# Patient Record
Sex: MALE | Race: OTHER | HISPANIC OR LATINO | URBAN - METROPOLITAN AREA
[De-identification: names, ages, dates, MRNs, and addresses within clinical notes are randomized per-mention and may not be internally consistent; named-entity substitution may affect disease eponyms.]

---

## 2019-01-28 ENCOUNTER — OUTPATIENT (OUTPATIENT)
Dept: OUTPATIENT SERVICES | Facility: HOSPITAL | Age: 54
LOS: 1 days | End: 2019-01-28
Payer: MEDICAID

## 2019-01-28 ENCOUNTER — APPOINTMENT (OUTPATIENT)
Dept: NEUROSURGERY | Facility: CLINIC | Age: 54
End: 2019-01-28
Payer: MEDICAID

## 2019-01-28 VITALS
BODY MASS INDEX: 27.99 KG/M2 | SYSTOLIC BLOOD PRESSURE: 131 MMHG | HEIGHT: 69 IN | WEIGHT: 189 LBS | TEMPERATURE: 98.4 F | DIASTOLIC BLOOD PRESSURE: 91 MMHG | OXYGEN SATURATION: 95 % | HEART RATE: 86 BPM | RESPIRATION RATE: 16 BRPM

## 2019-01-28 DIAGNOSIS — R29.898 OTHER SYMPTOMS AND SIGNS INVOLVING THE MUSCULOSKELETAL SYSTEM: ICD-10-CM

## 2019-01-28 PROBLEM — Z00.00 ENCOUNTER FOR PREVENTIVE HEALTH EXAMINATION: Status: ACTIVE | Noted: 2019-01-28

## 2019-01-28 PROCEDURE — 72050 X-RAY EXAM NECK SPINE 4/5VWS: CPT

## 2019-01-28 PROCEDURE — 99215 OFFICE O/P EST HI 40 MIN: CPT

## 2019-01-28 PROCEDURE — 72050 X-RAY EXAM NECK SPINE 4/5VWS: CPT | Mod: 26

## 2019-01-29 PROBLEM — R29.898 WEAKNESS OF BOTH LOWER EXTREMITIES: Status: ACTIVE | Noted: 2019-01-29

## 2019-01-29 NOTE — REASON FOR VISIT
[New Patient Visit] : a new patient visit [[employer] ] : provided by [employer]   [FreeTextEntry1] : [id number] [FreeTextEntry2] : Shannan

## 2019-01-29 NOTE — PHYSICAL EXAM
[General Appearance - Alert] : alert [General Appearance - In No Acute Distress] : in no acute distress [Oriented To Time, Place, And Person] : oriented to person, place, and time [Sclera] : the sclera and conjunctiva were normal [PERRL With Normal Accommodation] : pupils were equal in size, round, reactive to light, with normal accommodation [Extraocular Movements] : extraocular movements were intact [Outer Ear] : the ears and nose were normal in appearance [Neck Appearance] : the appearance of the neck was normal [] : no respiratory distress [Respiration, Rhythm And Depth] : normal respiratory rhythm and effort [Heart Rate And Rhythm] : heart rate was normal and rhythm regular [Skin Color & Pigmentation] : normal skin color and pigmentation [FreeTextEntry6] : bilateral lower extremity strength 4/5 [FreeTextEntry9] : + Perry's sign

## 2019-01-29 NOTE — REVIEW OF SYSTEMS
[Negative] : Heme/Lymph [As Noted in HPI] : as noted in HPI [Arm Weakness] : arm weakness [Hand Weakness] :  hand weakness [Leg Weakness] : leg weakness [Numbness] : numbness [Tingling] : tingling [Difficulty Walking] : difficulty walking

## 2019-01-29 NOTE — HISTORY OF PRESENT ILLNESS
[de-identified] : 53 year old, right handed, man with no pertinent past medical history who presents to [facility] today for neurosurgical evaluation of cervical spine. He reported to [place] ED with reports of progressive worsening neck and back pain radiating to bilateral lower extremities with associated numbness/tingling.  He reports bilateral leg weakness and significant gait disturbance. He uses a cane to ambulate. Denies bowel/bladder incontinence. MRI cervical spine indicates severe cervical stenosis at C6-C7 with cervical cord compression.\par \par His symptoms are greatly affecting his quality of life. He can no longer work or perform daily routine activities due to severity of symptoms.

## 2019-01-29 NOTE — DATA REVIEWED
[de-identified] : Accession No. : [id number]\par Patient Name / ID : OSEI JAY  / [id number]\par Exam Date : 2019 18:27:35 ( Approved )\par Study Comment : \par Sex / Age : M  / 53Y\par \par Creator : \par Dictator : \par  : \par Approver : \par Approver2 : \par \par Report Date : \par My Comment : \par ***********************************************************************************\par \par [facility]\par [address] [place] [address]\par [phone]\[facility] Department of Medical Imaging\par -------------------------------------------------------------------------------\par ------------------------------------------------------------------------------\par Patient: MISTY FRANZ        Unit #: [id number]       Acct#: [id number]\par : 63   Age: 53        Sex: M\par Report #: [id number]        Service Date: 19       Service Time: 1613\par Order #: [id number]       Location: [facility]       Admit Source: SED\par Attending MD: ENRIQUETA CHAVIS MD\par Ordering MD: ENRIQUETA CHAVIS MD\par Room/Bed: 1-A\par -------------------------------------------------------------------------------\par ------------------------------------------------------------------------------\par MAGNETIC RESONANCE IMAGING  REPORT\par Procedure: MRI CERVICAL SPINE W/O CONT\par -------------------------------------------------------------------------------\par -------------------------------------------------------------------------------\par Signed\par CSPINE_MR\par EXAM:\par MR Cervical Spine Without Contrast\par EXAM DATE/TIME:\par 2019 4:13 PM\par CLINICAL HISTORY:\par 53 years old, male; 723.0 cervical spinal stenosis . Inpatient.\par TECHNIQUE:\par Multiplanar magnetic resonance images of the cervical spine\par without contrast.\par COMPARISON:\par CT-CERVICAL SPINE W/O CONTRAST 2019 10:31 PM. Cervical\par spine x-ray\par \par FINDINGS:\par Vertebrae: No compression deformity. C6-7 1 millimeter\par anterolisthesis\par similar to prior CT scan yesterday. No focal destructive marrow\par lesion.\par DISCS/SPINAL CANAL/NEURAL FORAMINA:\par C2-C3: No stenosis.\par C3-C4: Disc bulge and indenting and severely effacing the\par ventral\par subarachnoid space. AP diameter central osseous canal and the\par midline\par approximately 7 mm.\par C4-C5: Moderate right and severe degenerative right foraminal\par stenosis.\par C5-C6: Disc bulging indenting but not effacing the ventral\par subarachnoid\par space. Degenerative moderate left foraminal stenosis.\par C6-C7: Aforementioned listhesis with a left paracentral disc\par extrusion\par indenting the cervical spinal cord. Hypertrophy of the\par ligamentum flavum and\par the disc pathology healed severe central stenosis with cord\par compression and\par myelopathy.\par C7-T1: No stenosis.\par Spinal cord: Compressed at C5-6 with myelopathic signal at this\par level. Cord\par normal size.\par Vasculature: not formally evaluated\par Soft tissues: not formally evaluated\par IMPRESSION:\par 1. C6-7 degenerative anterolisthesis as above producing severe\par central\par stenosis with cervical spinal cord compression and myelopathy.\par Listhesis is\par similar compared to the prior CT and prior x-rays.\par 2. Additional degenerative changes cervical spine with\par peripheral stenosis as\par described in further detail above.\par This report was electronically signed by:\par Fritz Francis MD\par <<Signature on File>>\par Dictating MD:   FRITZ FRANCIS MD\par Signing MD:  FRITZ FRANCIS MD\par Dictation D/T: 19\par Transcription D/T: 19\par Transcribed By:\par Report #: [id number]\par

## 2019-01-29 NOTE — PLAN
[FreeTextEntry1] : Imaging reviewed in detail with patient and patient’s family \par Surgery recommended, C6-C7 laminectomy, C5-T1 Fusion\par Discussed goal of surgery is to HALT progression of symptoms.\par Risks, benefits and alternatives to surgery discussed with patient. \par Multiple questions answered to patient’s satisfaction. \par Risks include but is not limited to infection, no resolution of symptoms/device failure\par Education provided regarding pre-operative clearance requirements\par PST packet reviewed with and given to patient\par Education provided regarding use of chlorhexidine wipes pre-op\par Nasal Swab completed for MRSA\par \par Surgery scheduled for 2/12/19.\par \par Patient and patient’s family understand and wish to proceed with planned surgery. \par \par Plan:\par 1. Surgery recommended - scheduled for 2/12/19\par \par \par \par

## 2019-01-30 ENCOUNTER — OUTPATIENT (OUTPATIENT)
Dept: OUTPATIENT SERVICES | Facility: HOSPITAL | Age: 54
LOS: 1 days | End: 2019-01-30
Payer: COMMERCIAL

## 2019-01-30 DIAGNOSIS — G95.20 UNSPECIFIED CORD COMPRESSION: ICD-10-CM

## 2019-01-30 DIAGNOSIS — M48.02 SPINAL STENOSIS, CERVICAL REGION: ICD-10-CM

## 2019-01-30 DIAGNOSIS — Z22.321 CARRIER OR SUSPECTED CARRIER OF METHICILLIN SUSCEPTIBLE STAPHYLOCOCCUS AUREUS: ICD-10-CM

## 2019-01-30 PROCEDURE — 87641 MR-STAPH DNA AMP PROBE: CPT

## 2019-02-11 VITALS
RESPIRATION RATE: 16 BRPM | HEART RATE: 84 BPM | HEIGHT: 69 IN | DIASTOLIC BLOOD PRESSURE: 83 MMHG | OXYGEN SATURATION: 98 % | TEMPERATURE: 97 F | SYSTOLIC BLOOD PRESSURE: 122 MMHG | WEIGHT: 188.94 LBS

## 2019-02-11 NOTE — PATIENT PROFILE ADULT - NSPROEDALEARNPREF_GEN_A_NUR
computer/internet/skill demonstration/audio/verbal instruction/written material/group instruction/individual instruction

## 2019-02-12 ENCOUNTER — INPATIENT (INPATIENT)
Facility: HOSPITAL | Age: 54
LOS: 5 days | Discharge: NURSING, REHAB OR LONG-TERM CARE | DRG: 472 | End: 2019-02-18
Attending: NEUROLOGICAL SURGERY | Admitting: NEUROLOGICAL SURGERY
Payer: COMMERCIAL

## 2019-02-12 DIAGNOSIS — M48.02 SPINAL STENOSIS, CERVICAL REGION: ICD-10-CM

## 2019-02-12 PROCEDURE — 22600 ARTHRD PST TQ 1NTRSPC CRV: CPT

## 2019-02-12 PROCEDURE — 63045 LAM FACETEC & FORAMOT CRV: CPT

## 2019-02-12 PROCEDURE — 63048 LAM FACETEC &FORAMOT EA ADDL: CPT

## 2019-02-12 PROCEDURE — 61783 SCAN PROC SPINAL: CPT | Mod: 59

## 2019-02-12 PROCEDURE — 22842 INSERT SPINE FIXATION DEVICE: CPT

## 2019-02-12 PROCEDURE — 22614 ARTHRD PST TQ 1NTRSPC EA ADD: CPT

## 2019-02-12 RX ORDER — ONDANSETRON 8 MG/1
4 TABLET, FILM COATED ORAL EVERY 6 HOURS
Qty: 0 | Refills: 0 | Status: DISCONTINUED | OUTPATIENT
Start: 2019-02-12 | End: 2019-02-18

## 2019-02-12 RX ORDER — ACETAMINOPHEN 500 MG
1000 TABLET ORAL ONCE
Qty: 0 | Refills: 0 | Status: COMPLETED | OUTPATIENT
Start: 2019-02-12 | End: 2019-02-13

## 2019-02-12 RX ORDER — HYDROMORPHONE HYDROCHLORIDE 2 MG/ML
0.5 INJECTION INTRAMUSCULAR; INTRAVENOUS; SUBCUTANEOUS
Qty: 0 | Refills: 0 | Status: DISCONTINUED | OUTPATIENT
Start: 2019-02-12 | End: 2019-02-14

## 2019-02-12 RX ORDER — PANTOPRAZOLE SODIUM 20 MG/1
40 TABLET, DELAYED RELEASE ORAL DAILY
Qty: 0 | Refills: 0 | Status: DISCONTINUED | OUTPATIENT
Start: 2019-02-12 | End: 2019-02-13

## 2019-02-12 RX ORDER — CEFAZOLIN SODIUM 1 G
2000 VIAL (EA) INJECTION EVERY 8 HOURS
Qty: 0 | Refills: 0 | Status: DISCONTINUED | OUTPATIENT
Start: 2019-02-12 | End: 2019-02-12

## 2019-02-12 RX ORDER — ACETAMINOPHEN 500 MG
650 TABLET ORAL EVERY 6 HOURS
Qty: 0 | Refills: 0 | Status: DISCONTINUED | OUTPATIENT
Start: 2019-02-12 | End: 2019-02-12

## 2019-02-12 RX ORDER — SENNA PLUS 8.6 MG/1
2 TABLET ORAL AT BEDTIME
Qty: 0 | Refills: 0 | Status: DISCONTINUED | OUTPATIENT
Start: 2019-02-12 | End: 2019-02-18

## 2019-02-12 RX ORDER — OMEPRAZOLE 10 MG/1
1 CAPSULE, DELAYED RELEASE ORAL
Qty: 0 | Refills: 0 | COMMUNITY

## 2019-02-12 RX ORDER — CEFAZOLIN SODIUM 1 G
2000 VIAL (EA) INJECTION EVERY 8 HOURS
Qty: 0 | Refills: 0 | Status: COMPLETED | OUTPATIENT
Start: 2019-02-12 | End: 2019-02-13

## 2019-02-12 RX ORDER — HYDROMORPHONE HYDROCHLORIDE 2 MG/ML
0.5 INJECTION INTRAMUSCULAR; INTRAVENOUS; SUBCUTANEOUS
Qty: 0 | Refills: 0 | Status: DISCONTINUED | OUTPATIENT
Start: 2019-02-12 | End: 2019-02-12

## 2019-02-12 RX ORDER — SODIUM CHLORIDE 9 MG/ML
1000 INJECTION INTRAMUSCULAR; INTRAVENOUS; SUBCUTANEOUS
Qty: 0 | Refills: 0 | Status: DISCONTINUED | OUTPATIENT
Start: 2019-02-12 | End: 2019-02-14

## 2019-02-12 RX ORDER — ACETAMINOPHEN 500 MG
650 TABLET ORAL EVERY 6 HOURS
Qty: 0 | Refills: 0 | Status: DISCONTINUED | OUTPATIENT
Start: 2019-02-12 | End: 2019-02-18

## 2019-02-12 RX ORDER — DIAZEPAM 5 MG
5 TABLET ORAL EVERY 8 HOURS
Qty: 0 | Refills: 0 | Status: DISCONTINUED | OUTPATIENT
Start: 2019-02-12 | End: 2019-02-13

## 2019-02-12 RX ORDER — DOCUSATE SODIUM 100 MG
100 CAPSULE ORAL THREE TIMES A DAY
Qty: 0 | Refills: 0 | Status: DISCONTINUED | OUTPATIENT
Start: 2019-02-12 | End: 2019-02-18

## 2019-02-12 RX ORDER — HYDROMORPHONE HYDROCHLORIDE 2 MG/ML
30 INJECTION INTRAMUSCULAR; INTRAVENOUS; SUBCUTANEOUS
Qty: 0 | Refills: 0 | Status: DISCONTINUED | OUTPATIENT
Start: 2019-02-12 | End: 2019-02-14

## 2019-02-12 RX ADMIN — HYDROMORPHONE HYDROCHLORIDE 30 MILLILITER(S): 2 INJECTION INTRAMUSCULAR; INTRAVENOUS; SUBCUTANEOUS at 22:01

## 2019-02-12 RX ADMIN — HYDROMORPHONE HYDROCHLORIDE 0.5 MILLIGRAM(S): 2 INJECTION INTRAMUSCULAR; INTRAVENOUS; SUBCUTANEOUS at 20:36

## 2019-02-12 RX ADMIN — HYDROMORPHONE HYDROCHLORIDE 0.5 MILLIGRAM(S): 2 INJECTION INTRAMUSCULAR; INTRAVENOUS; SUBCUTANEOUS at 21:10

## 2019-02-12 NOTE — H&P ADULT - NSHPPHYSICALEXAM_GEN_ALL_CORE
A/Ox3, follows commands, speech clear  B/L LE 4/5, B/L UE 5/5 strength  Sensation intact light touch all dermatomes  +Prery's sign

## 2019-02-12 NOTE — H&P ADULT - HISTORY OF PRESENT ILLNESS
53 year old, right handed, man with no pertinent past medical history who presents to [facility] today for neurosurgical procedure of cervical spine. He reported to [place] ED with reports of progressive worsening neck and back pain radiating to bilateral lower extremities with associated numbness/tingling. He reports bilateral leg weakness and significant gait disturbance. He uses a cane to ambulate. Denies bowel/bladder incontinence. MRI cervical spine indicates severe cervical stenosis at C6-C7 with cervical cord compression.  His symptoms are greatly affecting his quality of life. He can no longer work or perform daily routine activities due to severity of symptoms

## 2019-02-12 NOTE — BRIEF OPERATIVE NOTE - PROCEDURE
<<-----Click on this checkbox to enter Procedure Laminectomy  02/12/2019  C6-7, C5-T1 fusion  Active  NSTASI

## 2019-02-12 NOTE — H&P ADULT - PROBLEM SELECTOR PLAN 1
-admit to neurosurgery, [facility]  -C6-7 lami, C5-T1 fusion  -PT/OOB  -Pain control  -DVT/GI ppx  -Dispo planning

## 2019-02-13 LAB
ANION GAP SERPL CALC-SCNC: 11 MMOL/L — SIGNIFICANT CHANGE UP (ref 5–17)
BUN SERPL-MCNC: 22 MG/DL — SIGNIFICANT CHANGE UP (ref 7–23)
CALCIUM SERPL-MCNC: 9.2 MG/DL — SIGNIFICANT CHANGE UP (ref 8.4–10.5)
CHLORIDE SERPL-SCNC: 100 MMOL/L — SIGNIFICANT CHANGE UP (ref 96–108)
CO2 SERPL-SCNC: 25 MMOL/L — SIGNIFICANT CHANGE UP (ref 22–31)
CREAT SERPL-MCNC: 1.52 MG/DL — HIGH (ref 0.5–1.3)
GLUCOSE SERPL-MCNC: 141 MG/DL — HIGH (ref 70–99)
HCT VFR BLD CALC: 36.4 % — LOW (ref 39–50)
HGB BLD-MCNC: 12 G/DL — LOW (ref 13–17)
MAGNESIUM SERPL-MCNC: 1.8 MG/DL — SIGNIFICANT CHANGE UP (ref 1.6–2.6)
MCHC RBC-ENTMCNC: 32.6 PG — SIGNIFICANT CHANGE UP (ref 27–34)
MCHC RBC-ENTMCNC: 33 GM/DL — SIGNIFICANT CHANGE UP (ref 32–36)
MCV RBC AUTO: 98.9 FL — SIGNIFICANT CHANGE UP (ref 80–100)
NRBC # BLD: 0 /100 WBCS — SIGNIFICANT CHANGE UP (ref 0–0)
PHOSPHATE SERPL-MCNC: 4.4 MG/DL — SIGNIFICANT CHANGE UP (ref 2.5–4.5)
PLATELET # BLD AUTO: 203 K/UL — SIGNIFICANT CHANGE UP (ref 150–400)
POTASSIUM SERPL-MCNC: 4.6 MMOL/L — SIGNIFICANT CHANGE UP (ref 3.5–5.3)
POTASSIUM SERPL-SCNC: 4.6 MMOL/L — SIGNIFICANT CHANGE UP (ref 3.5–5.3)
RBC # BLD: 3.68 M/UL — LOW (ref 4.2–5.8)
RBC # FLD: 13.4 % — SIGNIFICANT CHANGE UP (ref 10.3–14.5)
SODIUM SERPL-SCNC: 136 MMOL/L — SIGNIFICANT CHANGE UP (ref 135–145)
WBC # BLD: 10.17 K/UL — SIGNIFICANT CHANGE UP (ref 3.8–10.5)
WBC # FLD AUTO: 10.17 K/UL — SIGNIFICANT CHANGE UP (ref 3.8–10.5)

## 2019-02-13 RX ORDER — DIAZEPAM 5 MG
5 TABLET ORAL EVERY 8 HOURS
Qty: 0 | Refills: 0 | Status: DISCONTINUED | OUTPATIENT
Start: 2019-02-13 | End: 2019-02-14

## 2019-02-13 RX ORDER — ACETAMINOPHEN 500 MG
TABLET ORAL
Qty: 0 | Refills: 0 | Status: DISCONTINUED | OUTPATIENT
Start: 2019-02-13 | End: 2019-02-13

## 2019-02-13 RX ORDER — PANTOPRAZOLE SODIUM 20 MG/1
40 TABLET, DELAYED RELEASE ORAL DAILY
Qty: 0 | Refills: 0 | Status: DISCONTINUED | OUTPATIENT
Start: 2019-02-13 | End: 2019-02-18

## 2019-02-13 RX ORDER — ACETAMINOPHEN 500 MG
1000 TABLET ORAL EVERY 6 HOURS
Qty: 0 | Refills: 0 | Status: COMPLETED | OUTPATIENT
Start: 2019-02-13 | End: 2019-02-14

## 2019-02-13 RX ADMIN — Medication 2000 MILLIGRAM(S): at 10:00

## 2019-02-13 RX ADMIN — SODIUM CHLORIDE 100 MILLILITER(S): 9 INJECTION INTRAMUSCULAR; INTRAVENOUS; SUBCUTANEOUS at 08:37

## 2019-02-13 RX ADMIN — Medication 100 MILLIGRAM(S): at 13:19

## 2019-02-13 RX ADMIN — Medication 1000 MILLIGRAM(S): at 10:30

## 2019-02-13 RX ADMIN — Medication 1000 MILLIGRAM(S): at 01:00

## 2019-02-13 RX ADMIN — HYDROMORPHONE HYDROCHLORIDE 0.5 MILLIGRAM(S): 2 INJECTION INTRAMUSCULAR; INTRAVENOUS; SUBCUTANEOUS at 01:14

## 2019-02-13 RX ADMIN — Medication 50 MILLIGRAM(S): at 21:46

## 2019-02-13 RX ADMIN — Medication 5 MILLIGRAM(S): at 21:46

## 2019-02-13 RX ADMIN — Medication 400 MILLIGRAM(S): at 10:00

## 2019-02-13 RX ADMIN — Medication 2000 MILLIGRAM(S): at 21:45

## 2019-02-13 RX ADMIN — Medication 1000 MILLIGRAM(S): at 18:54

## 2019-02-13 RX ADMIN — Medication 100 MILLIGRAM(S): at 21:46

## 2019-02-13 RX ADMIN — SODIUM CHLORIDE 100 MILLILITER(S): 9 INJECTION INTRAMUSCULAR; INTRAVENOUS; SUBCUTANEOUS at 18:22

## 2019-02-13 RX ADMIN — Medication 2000 MILLIGRAM(S): at 02:02

## 2019-02-13 RX ADMIN — HYDROMORPHONE HYDROCHLORIDE 30 MILLILITER(S): 2 INJECTION INTRAMUSCULAR; INTRAVENOUS; SUBCUTANEOUS at 18:52

## 2019-02-13 RX ADMIN — Medication 50 MILLIGRAM(S): at 09:59

## 2019-02-13 RX ADMIN — Medication 50 MILLIGRAM(S): at 13:19

## 2019-02-13 RX ADMIN — Medication 400 MILLIGRAM(S): at 18:13

## 2019-02-13 RX ADMIN — Medication 5 MILLIGRAM(S): at 08:37

## 2019-02-13 RX ADMIN — Medication 400 MILLIGRAM(S): at 00:36

## 2019-02-13 RX ADMIN — Medication 100 MILLIGRAM(S): at 06:51

## 2019-02-13 RX ADMIN — SENNA PLUS 2 TABLET(S): 8.6 TABLET ORAL at 21:46

## 2019-02-13 RX ADMIN — Medication 5 MILLIGRAM(S): at 00:35

## 2019-02-13 RX ADMIN — Medication 5 MILLIGRAM(S): at 13:19

## 2019-02-13 RX ADMIN — PANTOPRAZOLE SODIUM 40 MILLIGRAM(S): 20 TABLET, DELAYED RELEASE ORAL at 10:01

## 2019-02-13 NOTE — CONSULT NOTE ADULT - SUBJECTIVE AND OBJECTIVE BOX
Patient is a 53y old  Male who presents with a chief complaint of neck pain (13 Feb 2019 10:24)        HPI:  53 year old, right handed, man with no pertinent past medical history who presents to [facility] today for neurosurgical procedure of cervical spine. He reported to [place] ED with reports of progressive worsening neck and back pain radiating to bilateral lower extremities with associated numbness/tingling. He reports bilateral leg weakness and significant gait disturbance. He uses a cane to ambulate. Denies bowel/bladder incontinence. MRI cervical spine indicates severe cervical stenosis at C6-C7 with cervical cord compression.  His symptoms are greatly affecting his quality of life. He can no longer work or perform daily routine activities due to severity of symptoms (12 Feb 2019 13:18)     s/p surgery - no new tremors   Allergies  No Known Allergies      Health Issues  CERVICALSTENO M47.12 M53  CERVICALSTENO M47.12 M53.  CERVICALSTENO M47.12 M53.2  Handoff  MEWS Score  Cervical stenosis of spine  Cervical stenosis of spinal canal  Cervical stenosis of spinal canal  Cervical stenosis of spine  Laminectomy  No significant past surgical history        FAMILY HISTORY:      MEDICATIONS  (STANDING):  acetaminophen  IVPB .. 1000 milliGRAM(s) IV Intermittent every 6 hours  ceFAZolin  Injectable. 2000 milliGRAM(s) IV Push every 8 hours  diazepam    Tablet 5 milliGRAM(s) Oral every 8 hours  docusate sodium 100 milliGRAM(s) Oral three times a day  HYDROmorphone PCA (1 mG/mL) 30 milliLiter(s) PCA Continuous PCA Continuous  pantoprazole    Tablet 40 milliGRAM(s) Oral daily  pregabalin 50 milliGRAM(s) Oral every 8 hours  senna 2 Tablet(s) Oral at bedtime  sodium chloride 0.9%. 1000 milliLiter(s) (100 mL/Hr) IV Continuous <Continuous>    MEDICATIONS  (PRN):  acetaminophen   Tablet .. 650 milliGRAM(s) Oral every 6 hours PRN Temp greater or equal to 38C (100.4F), Mild Pain (1 - 3)  HYDROmorphone PCA (5 mG/mL) Rescue Clinician Bolus 0.5 milliGRAM(s) IV Push every 30 minutes PRN breakthrough pain  ondansetron Injectable 4 milliGRAM(s) IV Push every 6 hours PRN Nausea and/or Vomiting      PAST MEDICAL & SURGICAL HISTORY:  Cervical stenosis of spine  No significant past surgical history      Labs                          12.0   10.17 )-----------( 203      ( 13 Feb 2019 07:30 )             36.4     02-13    136  |  100  |  22  ----------------------------<  141<H>  4.6   |  25  |  1.52<H>    Ca    9.2      13 Feb 2019 07:30  Phos  4.4     02-13  Mg     1.8     02-13        Radiology:    Physical Exam    MENTAL STATUS  -Level of Consciousness- awake    Orientation- person, place time  Language- aphasia/ dysarthria- nl  Memory- recent and remote- nl      Cranial Nerve 1- 12  Pupils- equal and reactive  Eye movements- full  Facial - no asymmetry   Lower CN-nl    Gait and Station- n/a    MOTOR  Upper- no drift  Lower- no foot drop    Reflexes-  no long tract findings    Sensation - no new sensory findings    Cerebellar- no tremors    vascular - mo bruits    Assessment- Cervical radiculopathy    Plan  No new deficits post op

## 2019-02-13 NOTE — PHYSICAL THERAPY INITIAL EVALUATION ADULT - PERTINENT HX OF CURRENT PROBLEM, REHAB EVAL
53 year old, right handed. He reported to [place] ED with reports of progressive worsening neck and back pain radiating to bilateral lower extremities with associated numbness/tingling. He reports bilateral leg weakness and significant gait disturbance.Denies bowel/bladder incontinence. MRI cervical spine indicates severe cervical stenosis at C6-C7 with cervical cord compression.  His symptoms are greatly affecting his quality of life..

## 2019-02-13 NOTE — PHYSICAL THERAPY INITIAL EVALUATION ADULT - ADDITIONAL COMMENTS
Patient lives with spouse and sister in private house with 14 steps to enter. Uses RW baseline. Dependent on family to negotiate stairs.  Denies falls.

## 2019-02-13 NOTE — OCCUPATIONAL THERAPY INITIAL EVALUATION ADULT - STANDING BALANCE: DYNAMIC, REHAB EVAL
fair minus/Pt ambulated ~30ft with RW and CG for safety and line management, +verbal cues for step length

## 2019-02-13 NOTE — PROGRESS NOTE ADULT - SUBJECTIVE AND OBJECTIVE BOX
HPI:  53 year old, right handed, man with no pertinent past medical history who presents to [facility] today for neurosurgical procedure of cervical spine. He reported to [place] ED with reports of progressive worsening neck and back pain radiating to bilateral lower extremities with associated numbness/tingling. He reports bilateral leg weakness and significant gait disturbance. He uses a cane to ambulate. Denies bowel/bladder incontinence. MRI cervical spine indicates severe cervical stenosis at C6-C7 with cervical cord compression.  His symptoms are greatly affecting his quality of life. He can no longer work or perform daily routine activities due to severity of symptoms (12 Feb 2019 13:18)    OVERNIGHT EVENTS:  Vital Signs Last 24 Hrs  T(C): 37.1 (13 Feb 2019 09:13), Max: 37.3 (13 Feb 2019 00:41)  T(F): 98.7 (13 Feb 2019 09:13), Max: 99.1 (13 Feb 2019 00:41)  HR: 107 (13 Feb 2019 09:36) (96 - 114)  BP: 126/84 (13 Feb 2019 09:13) (112/91 - 151/84)  BP(mean): 101 (12 Feb 2019 22:30) (91 - 111)  RR: 18 (13 Feb 2019 09:13) (10 - 76)  SpO2: 93% (13 Feb 2019 09:36) (87% - 99%)    I&O's Summary    12 Feb 2019 07:01  -  13 Feb 2019 07:00  --------------------------------------------------------  IN: 1275 mL / OUT: 1560 mL / NET: -285 mL        PHYSICAL EXAM:  Neurological:    Exam:  AOx3, NAD, fluent speech   PERRL, EOMI, visual fields intact   CN II-XII intact       Motor exam:         [] Upper extremity              Bi(c5)  WE(c6)  EE(c7)   FF(c8)                                                R         5/5        5/5        5/5       5/5                                               L          5/5        5/5        5/5       5/5         [] Lower extremeity          HF(l2)   KE(l3)    TA(l4)   EHL(l5)  GS(s1)                                                 R        5/5        5/5        5/5       5/5         5/5                                               L         5/5        5/5       5/5       5/5          5/5                                                        [] warm well perfused; capillary refill <3 seconds     Sensation: [] intact to light touch  [] decreased:       Cardiovascular: RRR  Respiratory: Lungs CTAB  Gastrointestinal: +BS  Genitourinary: voiding without diffiuclty  Extremities: warm and dry  Incision/Wound: CDi        DIET: Regular    LABS:                        12.0   10.17 )-----------( 203      ( 13 Feb 2019 07:30 )             36.4     02-13    136  |  100  |  22  ----------------------------<  141<H>  4.6   |  25  |  1.52<H>    Ca    9.2      13 Feb 2019 07:30  Phos  4.4     02-13  Mg     1.8     02-13        CAPILLARY BLOOD GLUCOSE      Drug Levels: [] N/A    CSF Analysis: [] N/A      Allergies    No Known Allergies    Intolerances      MEDICATIONS:  Antibiotics:  ceFAZolin  Injectable. 2000 milliGRAM(s) IV Push every 8 hours    Neuro:  acetaminophen   Tablet .. 650 milliGRAM(s) Oral every 6 hours PRN  acetaminophen  IVPB .. 1000 milliGRAM(s) IV Intermittent every 6 hours  diazepam    Tablet 5 milliGRAM(s) Oral every 8 hours  HYDROmorphone PCA (1 mG/mL) 30 milliLiter(s) PCA Continuous PCA Continuous  HYDROmorphone PCA (5 mG/mL) Rescue Clinician Bolus 0.5 milliGRAM(s) IV Push every 30 minutes PRN  ondansetron Injectable 4 milliGRAM(s) IV Push every 6 hours PRN  pregabalin 50 milliGRAM(s) Oral every 8 hours    Anticoagulation:    OTHER:  docusate sodium 100 milliGRAM(s) Oral three times a day  pantoprazole    Tablet 40 milliGRAM(s) Oral daily  senna 2 Tablet(s) Oral at bedtime    IVF:  sodium chloride 0.9%. 1000 milliLiter(s) IV Continuous <Continuous>    ASSESSMENT:  53y Male s/p POD#1  S/P C6-C7 laminectomy and 5-T1 posterior fusion.     PLAN:    NEURO:    Monitor neuro status  OT/PT   Pain Management   Bowel regime  DC conrad  Monitor hemovact  Continue current medical regime    Dispo: Discussed with attending

## 2019-02-13 NOTE — PHYSICAL THERAPY INITIAL EVALUATION ADULT - CRITERIA FOR SKILLED THERAPEUTIC INTERVENTIONS
rehab potential/functional limitations in following categories/predicted duration of therapy intervention/risk reduction/prevention/anticipated discharge recommendation/impairments found/therapy frequency

## 2019-02-13 NOTE — PROGRESS NOTE ADULT - SUBJECTIVE AND OBJECTIVE BOX
NEUROSURGERY POST OP NOTE:    POD# 0 S/P C6-C7 laminectomy and 5-T1 posterior fusion.     S: Pt seen and examined at bedside on floor. Pt complaining of pain despite use of dilaudid PCA. He denies change in motor or sensation.        T(C): 37.3 (02-13-19 @ 00:41), Max: 37.3 (02-13-19 @ 00:41)  HR: 110 (02-13-19 @ 00:41) (106 - 114)  BP: 117/68 (02-13-19 @ 00:41) (112/91 - 151/84)  RR: 16 (02-13-19 @ 00:41) (10 - 27)  SpO2: 98% (02-13-19 @ 00:41) (87% - 98%)      02-12-19 @ 07:01  -  02-13-19 @ 00:58  --------------------------------------------------------  IN: 675 mL / OUT: 685 mL / NET: -10 mL        acetaminophen   Tablet .. 650 milliGRAM(s) Oral every 6 hours PRN  ceFAZolin  Injectable. 2000 milliGRAM(s) IV Push every 8 hours  diazepam    Tablet 5 milliGRAM(s) Oral every 8 hours  docusate sodium 100 milliGRAM(s) Oral three times a day  HYDROmorphone PCA (1 mG/mL) 30 milliLiter(s) PCA Continuous PCA Continuous  HYDROmorphone PCA (5 mG/mL) Rescue Clinician Bolus 0.5 milliGRAM(s) IV Push every 30 minutes PRN  ondansetron Injectable 4 milliGRAM(s) IV Push every 6 hours PRN  pantoprazole  Injectable 40 milliGRAM(s) IV Push daily  senna 2 Tablet(s) Oral at bedtime  sodium chloride 0.9%. 1000 milliLiter(s) IV Continuous <Continuous>      RADIOLOGY:     Exam:  AOx3, NAD, fluent speech   PERRL, EOMI, visual fields intact   CN II-XII intact   Face symmetric   No pronator drift   Motor 5/5 throughout   SILT     WOUND/DRAINS: [place] collar in place, incision c/d/i, dressing in place   +V   +conrad       Assessment: 53y Male POD# 0 S/P C6-C7 laminectomy and 5-T1 posterior fusion.       Plan:  - Pain control: dilaudid PCA, valium, tylenol   - Spine checks   - PT/OT evaluation   - Liz YADAV to be ordered tomorrow, [place] collar in interim   - ADAT   - Dc conrad in am   - Monitor HMV output   - AP/Lateral XR once HMV out   - BiPAP/CPAP overnight for SHALONDA   - Post-op ancef   - NS until tolerating PO   - Bowel reg   - D/w Dr. Rosas

## 2019-02-13 NOTE — OCCUPATIONAL THERAPY INITIAL EVALUATION ADULT - MD ORDER
Per chart, 53 year old, right handed. He reported to [place] ED with reports of progressive worsening neck and back pain radiating to bilateral lower extremities with associated numbness/tingling. He reports bilateral leg weakness and significant gait disturbance.Denies bowel/bladder incontinence. MRI cervical spine indicates severe cervical stenosis at C6-C7 with cervical cord compression.

## 2019-02-13 NOTE — OCCUPATIONAL THERAPY INITIAL EVALUATION ADULT - ADDITIONAL COMMENTS
Patient reports using a RW at baseline, denies use of other AE/AD. Needs assist to navigate stairs. Patient reports independence for ADL, wife and sister assist with IADL.

## 2019-02-13 NOTE — PROVIDER CONTACT NOTE (OTHER) - SITUATION
Pt.heart rate remains tachy as high as 119b/m, denies chest pain or palpitation, PCA dilaudid maintained.

## 2019-02-13 NOTE — PHYSICAL THERAPY INITIAL EVALUATION ADULT - GAIT DEVIATIONS NOTED, PT EVAL
increased time in double stance/decreased velocity of limb motion/decreased stride length/decreased swing-to-stance ratio/decreased step length

## 2019-02-13 NOTE — PHYSICAL THERAPY INITIAL EVALUATION ADULT - PLANNED THERAPY INTERVENTIONS, PT EVAL
gait training/neuromuscular re-education/strengthening/postural re-education/transfer training/balance training/bed mobility training

## 2019-02-13 NOTE — OCCUPATIONAL THERAPY INITIAL EVALUATION ADULT - GENERAL OBSERVATIONS, REHAB EVAL
Patient right hand dominant. Chart reviewed, BAN [place] cleared patient for OT evaluation. Patient Swedish speaking and translation provided by sister. Patient received supine in bed, NAD, +IV, +PCA, +SCDs, +tele, +conrad, wife and sister bedside.

## 2019-02-13 NOTE — PHYSICAL THERAPY INITIAL EVALUATION ADULT - GENERAL OBSERVATIONS, REHAB EVAL
Patient received semi-supine in no acute distress, +Cervical collar, +PCA, +IV, +Supplemental O2, +SCDs, +Hemovac, +[place]. Cleared by BAN [place].

## 2019-02-13 NOTE — OCCUPATIONAL THERAPY INITIAL EVALUATION ADULT - MANUAL MUSCLE TESTING RESULTS, REHAB EVAL
spinal precautions, b/l UE at least 4/5 based on functional observation, b/l  5/5/grossly assessed due to

## 2019-02-14 LAB
ANION GAP SERPL CALC-SCNC: 10 MMOL/L — SIGNIFICANT CHANGE UP (ref 5–17)
BUN SERPL-MCNC: 22 MG/DL — SIGNIFICANT CHANGE UP (ref 7–23)
CALCIUM SERPL-MCNC: 8.9 MG/DL — SIGNIFICANT CHANGE UP (ref 8.4–10.5)
CHLORIDE SERPL-SCNC: 97 MMOL/L — SIGNIFICANT CHANGE UP (ref 96–108)
CO2 SERPL-SCNC: 29 MMOL/L — SIGNIFICANT CHANGE UP (ref 22–31)
CREAT SERPL-MCNC: 1.5 MG/DL — HIGH (ref 0.5–1.3)
GLUCOSE SERPL-MCNC: 116 MG/DL — HIGH (ref 70–99)
HCT VFR BLD CALC: 33.8 % — LOW (ref 39–50)
HGB BLD-MCNC: 11 G/DL — LOW (ref 13–17)
MAGNESIUM SERPL-MCNC: 2.1 MG/DL — SIGNIFICANT CHANGE UP (ref 1.6–2.6)
MCHC RBC-ENTMCNC: 32.5 GM/DL — SIGNIFICANT CHANGE UP (ref 32–36)
MCHC RBC-ENTMCNC: 33.2 PG — SIGNIFICANT CHANGE UP (ref 27–34)
MCV RBC AUTO: 102.1 FL — HIGH (ref 80–100)
NRBC # BLD: 0 /100 WBCS — SIGNIFICANT CHANGE UP (ref 0–0)
PHOSPHATE SERPL-MCNC: 2.8 MG/DL — SIGNIFICANT CHANGE UP (ref 2.5–4.5)
PLATELET # BLD AUTO: 169 K/UL — SIGNIFICANT CHANGE UP (ref 150–400)
POTASSIUM SERPL-MCNC: 4.4 MMOL/L — SIGNIFICANT CHANGE UP (ref 3.5–5.3)
POTASSIUM SERPL-SCNC: 4.4 MMOL/L — SIGNIFICANT CHANGE UP (ref 3.5–5.3)
RBC # BLD: 3.31 M/UL — LOW (ref 4.2–5.8)
RBC # FLD: 13.5 % — SIGNIFICANT CHANGE UP (ref 10.3–14.5)
SODIUM SERPL-SCNC: 136 MMOL/L — SIGNIFICANT CHANGE UP (ref 135–145)
WBC # BLD: 9.98 K/UL — SIGNIFICANT CHANGE UP (ref 3.8–10.5)
WBC # FLD AUTO: 9.98 K/UL — SIGNIFICANT CHANGE UP (ref 3.8–10.5)

## 2019-02-14 RX ORDER — HYDROMORPHONE HYDROCHLORIDE 2 MG/ML
0.5 INJECTION INTRAMUSCULAR; INTRAVENOUS; SUBCUTANEOUS EVERY 4 HOURS
Qty: 0 | Refills: 0 | Status: DISCONTINUED | OUTPATIENT
Start: 2019-02-14 | End: 2019-02-16

## 2019-02-14 RX ORDER — OXYCODONE AND ACETAMINOPHEN 5; 325 MG/1; MG/1
2 TABLET ORAL EVERY 4 HOURS
Qty: 0 | Refills: 0 | Status: DISCONTINUED | OUTPATIENT
Start: 2019-02-14 | End: 2019-02-18

## 2019-02-14 RX ORDER — ENOXAPARIN SODIUM 100 MG/ML
40 INJECTION SUBCUTANEOUS AT BEDTIME
Qty: 0 | Refills: 0 | Status: DISCONTINUED | OUTPATIENT
Start: 2019-02-14 | End: 2019-02-18

## 2019-02-14 RX ORDER — OXYCODONE AND ACETAMINOPHEN 5; 325 MG/1; MG/1
1 TABLET ORAL EVERY 4 HOURS
Qty: 0 | Refills: 0 | Status: DISCONTINUED | OUTPATIENT
Start: 2019-02-14 | End: 2019-02-18

## 2019-02-14 RX ADMIN — Medication 650 MILLIGRAM(S): at 15:27

## 2019-02-14 RX ADMIN — SODIUM CHLORIDE 100 MILLILITER(S): 9 INJECTION INTRAMUSCULAR; INTRAVENOUS; SUBCUTANEOUS at 05:53

## 2019-02-14 RX ADMIN — Medication 100 MILLIGRAM(S): at 05:53

## 2019-02-14 RX ADMIN — Medication 50 MILLIGRAM(S): at 21:07

## 2019-02-14 RX ADMIN — Medication 100 MILLIGRAM(S): at 13:24

## 2019-02-14 RX ADMIN — PANTOPRAZOLE SODIUM 40 MILLIGRAM(S): 20 TABLET, DELAYED RELEASE ORAL at 13:24

## 2019-02-14 RX ADMIN — Medication 5 MILLIGRAM(S): at 05:53

## 2019-02-14 RX ADMIN — Medication 400 MILLIGRAM(S): at 00:54

## 2019-02-14 RX ADMIN — Medication 1000 MILLIGRAM(S): at 06:30

## 2019-02-14 RX ADMIN — Medication 50 MILLIGRAM(S): at 13:24

## 2019-02-14 RX ADMIN — ENOXAPARIN SODIUM 40 MILLIGRAM(S): 100 INJECTION SUBCUTANEOUS at 21:07

## 2019-02-14 RX ADMIN — Medication 1000 MILLIGRAM(S): at 01:20

## 2019-02-14 RX ADMIN — Medication 30 MILLILITER(S): at 06:27

## 2019-02-14 RX ADMIN — Medication 400 MILLIGRAM(S): at 05:53

## 2019-02-14 RX ADMIN — Medication 50 MILLIGRAM(S): at 05:53

## 2019-02-14 RX ADMIN — Medication 650 MILLIGRAM(S): at 14:54

## 2019-02-14 RX ADMIN — SODIUM CHLORIDE 100 MILLILITER(S): 9 INJECTION INTRAMUSCULAR; INTRAVENOUS; SUBCUTANEOUS at 14:30
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
## 2019-02-14 NOTE — PROGRESS NOTE ADULT - SUBJECTIVE AND OBJECTIVE BOX
Neurology Follow up note    Name  MISTY PARSONS    HPI:  53 year old, right handed, man with no pertinent past medical history who presents to [facility] today for neurosurgical procedure of cervical spine. He reported to [place] ED with reports of progressive worsening neck and back pain radiating to bilateral lower extremities with associated numbness/tingling. He reports bilateral leg weakness and significant gait disturbance. He uses a cane to ambulate. Denies bowel/bladder incontinence. MRI cervical spine indicates severe cervical stenosis at C6-C7 with cervical cord compression.  His symptoms are greatly affecting his quality of life. He can no longer work or perform daily routine activities due to severity of symptoms (12 Feb 2019 13:18)      Interval History - no new weakness in the UE/ LE        REVIEW OF SYSTEMS    Vital Signs Last 24 Hrs  T(C): 36.7 (14 Feb 2019 16:22), Max: 38.6 (14 Feb 2019 14:54)  T(F): 98 (14 Feb 2019 16:22), Max: 101.5 (14 Feb 2019 14:54)  HR: 109 (14 Feb 2019 16:22) (90 - 109)  BP: 108/74 (14 Feb 2019 16:22) (108/71 - 135/68)  BP(mean): --  RR: 18 (14 Feb 2019 16:22) (12 - 20)  SpO2: 96% (14 Feb 2019 16:22) (94% - 99%)    Physical Exam-     Mental Status- awake and alert    Cranial Nerves- full EOM    Gait and station- n/a    Motor- moves all 4 extremities    Reflexes- decreased    Sensation- no sensory level    Coordination- no tremors    Vascular - no bruits    Medications  acetaminophen   Tablet .. 650 milliGRAM(s) Oral every 6 hours PRN  aluminum hydroxide/magnesium hydroxide/simethicone Suspension 30 milliLiter(s) Oral every 6 hours PRN  diazepam    Tablet 5 milliGRAM(s) Oral every 8 hours  docusate sodium 100 milliGRAM(s) Oral three times a day  HYDROmorphone PCA (1 mG/mL) 30 milliLiter(s) PCA Continuous PCA Continuous  HYDROmorphone PCA (5 mG/mL) Rescue Clinician Bolus 0.5 milliGRAM(s) IV Push every 30 minutes PRN  ondansetron Injectable 4 milliGRAM(s) IV Push every 6 hours PRN  pantoprazole    Tablet 40 milliGRAM(s) Oral daily  pregabalin 50 milliGRAM(s) Oral every 8 hours  senna 2 Tablet(s) Oral at bedtime  sodium chloride 0.9%. 1000 milliLiter(s) IV Continuous <Continuous>      Lab      Radiology    Assessment- Cervical radiculopathy    Plan Rehab and pain

## 2019-02-15 LAB
ANION GAP SERPL CALC-SCNC: 10 MMOL/L — SIGNIFICANT CHANGE UP (ref 5–17)
BUN SERPL-MCNC: 13 MG/DL — SIGNIFICANT CHANGE UP (ref 7–23)
CALCIUM SERPL-MCNC: 9.2 MG/DL — SIGNIFICANT CHANGE UP (ref 8.4–10.5)
CHLORIDE SERPL-SCNC: 99 MMOL/L — SIGNIFICANT CHANGE UP (ref 96–108)
CO2 SERPL-SCNC: 28 MMOL/L — SIGNIFICANT CHANGE UP (ref 22–31)
CREAT SERPL-MCNC: 1.19 MG/DL — SIGNIFICANT CHANGE UP (ref 0.5–1.3)
GLUCOSE SERPL-MCNC: 111 MG/DL — HIGH (ref 70–99)
HCT VFR BLD CALC: 37 % — LOW (ref 39–50)
HGB BLD-MCNC: 12.3 G/DL — LOW (ref 13–17)
MAGNESIUM SERPL-MCNC: 2.1 MG/DL — SIGNIFICANT CHANGE UP (ref 1.6–2.6)
MCHC RBC-ENTMCNC: 33.1 PG — SIGNIFICANT CHANGE UP (ref 27–34)
MCHC RBC-ENTMCNC: 33.2 GM/DL — SIGNIFICANT CHANGE UP (ref 32–36)
MCV RBC AUTO: 99.5 FL — SIGNIFICANT CHANGE UP (ref 80–100)
NRBC # BLD: 0 /100 WBCS — SIGNIFICANT CHANGE UP (ref 0–0)
PHOSPHATE SERPL-MCNC: 3.1 MG/DL — SIGNIFICANT CHANGE UP (ref 2.5–4.5)
PLATELET # BLD AUTO: 175 K/UL — SIGNIFICANT CHANGE UP (ref 150–400)
POTASSIUM SERPL-MCNC: 3.8 MMOL/L — SIGNIFICANT CHANGE UP (ref 3.5–5.3)
POTASSIUM SERPL-SCNC: 3.8 MMOL/L — SIGNIFICANT CHANGE UP (ref 3.5–5.3)
RBC # BLD: 3.72 M/UL — LOW (ref 4.2–5.8)
RBC # FLD: 12.9 % — SIGNIFICANT CHANGE UP (ref 10.3–14.5)
SODIUM SERPL-SCNC: 137 MMOL/L — SIGNIFICANT CHANGE UP (ref 135–145)
WBC # BLD: 10.57 K/UL — HIGH (ref 3.8–10.5)
WBC # FLD AUTO: 10.57 K/UL — HIGH (ref 3.8–10.5)

## 2019-02-15 RX ORDER — OXYCODONE HYDROCHLORIDE 5 MG/1
10 TABLET ORAL EVERY 12 HOURS
Qty: 0 | Refills: 0 | Status: DISCONTINUED | OUTPATIENT
Start: 2019-02-15 | End: 2019-02-16

## 2019-02-15 RX ADMIN — Medication 100 MILLIGRAM(S): at 06:12

## 2019-02-15 RX ADMIN — OXYCODONE AND ACETAMINOPHEN 2 TABLET(S): 5; 325 TABLET ORAL at 22:46

## 2019-02-15 RX ADMIN — OXYCODONE AND ACETAMINOPHEN 2 TABLET(S): 5; 325 TABLET ORAL at 21:46

## 2019-02-15 RX ADMIN — OXYCODONE AND ACETAMINOPHEN 2 TABLET(S): 5; 325 TABLET ORAL at 13:48

## 2019-02-15 RX ADMIN — ENOXAPARIN SODIUM 40 MILLIGRAM(S): 100 INJECTION SUBCUTANEOUS at 22:50

## 2019-02-15 RX ADMIN — OXYCODONE AND ACETAMINOPHEN 2 TABLET(S): 5; 325 TABLET ORAL at 10:00

## 2019-02-15 RX ADMIN — OXYCODONE AND ACETAMINOPHEN 2 TABLET(S): 5; 325 TABLET ORAL at 08:45

## 2019-02-15 RX ADMIN — Medication 50 MILLIGRAM(S): at 21:47

## 2019-02-15 RX ADMIN — OXYCODONE HYDROCHLORIDE 10 MILLIGRAM(S): 5 TABLET ORAL at 17:58

## 2019-02-15 RX ADMIN — PANTOPRAZOLE SODIUM 40 MILLIGRAM(S): 20 TABLET, DELAYED RELEASE ORAL at 08:53

## 2019-02-15 RX ADMIN — HYDROMORPHONE HYDROCHLORIDE 0.5 MILLIGRAM(S): 2 INJECTION INTRAMUSCULAR; INTRAVENOUS; SUBCUTANEOUS at 10:30

## 2019-02-15 RX ADMIN — Medication 100 MILLIGRAM(S): at 13:00

## 2019-02-15 RX ADMIN — HYDROMORPHONE HYDROCHLORIDE 0.5 MILLIGRAM(S): 2 INJECTION INTRAMUSCULAR; INTRAVENOUS; SUBCUTANEOUS at 10:13

## 2019-02-15 RX ADMIN — SENNA PLUS 2 TABLET(S): 8.6 TABLET ORAL at 21:47

## 2019-02-15 RX ADMIN — Medication 50 MILLIGRAM(S): at 06:12

## 2019-02-15 RX ADMIN — Medication 100 MILLIGRAM(S): at 21:46

## 2019-02-15 RX ADMIN — OXYCODONE AND ACETAMINOPHEN 2 TABLET(S): 5; 325 TABLET ORAL at 00:24

## 2019-02-15 RX ADMIN — HYDROMORPHONE HYDROCHLORIDE 0.5 MILLIGRAM(S): 2 INJECTION INTRAMUSCULAR; INTRAVENOUS; SUBCUTANEOUS at 22:50

## 2019-02-15 RX ADMIN — OXYCODONE AND ACETAMINOPHEN 2 TABLET(S): 5; 325 TABLET ORAL at 12:48

## 2019-02-15 RX ADMIN — OXYCODONE AND ACETAMINOPHEN 2 TABLET(S): 5; 325 TABLET ORAL at 01:15

## 2019-02-15 RX ADMIN — HYDROMORPHONE HYDROCHLORIDE 0.5 MILLIGRAM(S): 2 INJECTION INTRAMUSCULAR; INTRAVENOUS; SUBCUTANEOUS at 23:20

## 2019-02-15 RX ADMIN — Medication 50 MILLIGRAM(S): at 13:00

## 2019-02-15 NOTE — DIETITIAN INITIAL EVALUATION ADULT. - OTHER INFO
53y Male with no significant PMHx per documentation S/P C6-C7 laminectomy and C5-T1 posterior fusion, POD3. PT/OT following, awaiting rehab. Used  services as pt Kenyan speaking ([id number]). Pt with decreased appetite 2/2 pain-d/w RN, pt consuming ~25-50% meals, reports good appetite PTA. Denies wt loss. Denies N/V. GI: WDL, last BM 2/11. Skin: surgical incision posterior neck. Encouraged pt to increase PO intake as able with emphasis on lean protein as needs increased s/p surgery. If appetite does not approve, can consider adding supplements. Will monitor and f/u per nutrition dept protocol.

## 2019-02-15 NOTE — PROGRESS NOTE ADULT - SUBJECTIVE AND OBJECTIVE BOX
Neurology Follow up note    Name  MISTY PARSONS    HPI:  53 year old, right handed, man with no pertinent past medical history who presents to [facility] today for neurosurgical procedure of cervical spine. He reported to [place] ED with reports of progressive worsening neck and back pain radiating to bilateral lower extremities with associated numbness/tingling. He reports bilateral leg weakness and significant gait disturbance. He uses a cane to ambulate. Denies bowel/bladder incontinence. MRI cervical spine indicates severe cervical stenosis at C6-C7 with cervical cord compression.  His symptoms are greatly affecting his quality of life. He can no longer work or perform daily routine activities due to severity of symptoms (12 Feb 2019 13:18)      Interval History - neck pain improved - no radicular symptoms        REVIEW OF SYSTEMS    Vital Signs Last 24 Hrs  T(C): 36.8 (15 Feb 2019 05:04), Max: 38.6 (14 Feb 2019 14:54)  T(F): 98.2 (15 Feb 2019 05:04), Max: 101.5 (14 Feb 2019 14:54)  HR: 92 (15 Feb 2019 05:04) (70 - 109)  BP: 117/80 (15 Feb 2019 05:04) (108/74 - 146/90)  BP(mean): --  RR: 17 (15 Feb 2019 05:04) (16 - 20)  SpO2: 97% (15 Feb 2019 05:04) (94% - 100%)    Physical Exam-     Mental Status- awake and alert    Cranial Nerves- full EOM    Gait and station- nl    Motor- moves all 4 extremities    Reflexes- decreased    Sensation- no sensory level    Coordination- no tremors    Vascular - no bruits    Medications  acetaminophen   Tablet .. 650 milliGRAM(s) Oral every 6 hours PRN  aluminum hydroxide/magnesium hydroxide/simethicone Suspension 30 milliLiter(s) Oral every 6 hours PRN  docusate sodium 100 milliGRAM(s) Oral three times a day  enoxaparin Injectable 40 milliGRAM(s) SubCutaneous at bedtime  HYDROmorphone  Injectable 0.5 milliGRAM(s) IV Push every 4 hours PRN  ondansetron Injectable 4 milliGRAM(s) IV Push every 6 hours PRN  oxyCODONE    5 mG/acetaminophen 325 mG 1 Tablet(s) Oral every 4 hours PRN  oxyCODONE    5 mG/acetaminophen 325 mG 2 Tablet(s) Oral every 4 hours PRN  pantoprazole    Tablet 40 milliGRAM(s) Oral daily  pregabalin 50 milliGRAM(s) Oral every 8 hours  senna 2 Tablet(s) Oral at bedtime      Lab      Radiology    Assessment- Cervical radiculopathy    Plan as per NS/Rehab

## 2019-02-15 NOTE — DIETITIAN INITIAL EVALUATION ADULT. - ENERGY NEEDS
Ht (2/11): 175.3cm, Wt (2/15 per pt): 85.9kg, IBW: 160# +/-10%, %IBW: 118%, BMI: 27.9  ABW used to calculate energy needs due to pt's current body weight within % IBW. Needs adjusted s/p surgery.
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
## 2019-02-16 LAB
ANION GAP SERPL CALC-SCNC: 11 MMOL/L — SIGNIFICANT CHANGE UP (ref 5–17)
APPEARANCE UR: CLEAR — SIGNIFICANT CHANGE UP
BILIRUB UR-MCNC: NEGATIVE — SIGNIFICANT CHANGE UP
BUN SERPL-MCNC: 17 MG/DL — SIGNIFICANT CHANGE UP (ref 7–23)
CALCIUM SERPL-MCNC: 9.2 MG/DL — SIGNIFICANT CHANGE UP (ref 8.4–10.5)
CHLORIDE SERPL-SCNC: 97 MMOL/L — SIGNIFICANT CHANGE UP (ref 96–108)
CO2 SERPL-SCNC: 30 MMOL/L — SIGNIFICANT CHANGE UP (ref 22–31)
COLOR SPEC: YELLOW — SIGNIFICANT CHANGE UP
CREAT SERPL-MCNC: 1.33 MG/DL — HIGH (ref 0.5–1.3)
DIFF PNL FLD: NEGATIVE — SIGNIFICANT CHANGE UP
GLUCOSE SERPL-MCNC: 119 MG/DL — HIGH (ref 70–99)
GLUCOSE UR QL: NEGATIVE — SIGNIFICANT CHANGE UP
HCT VFR BLD CALC: 36.7 % — LOW (ref 39–50)
HGB BLD-MCNC: 12.1 G/DL — LOW (ref 13–17)
KETONES UR-MCNC: NEGATIVE — SIGNIFICANT CHANGE UP
LEUKOCYTE ESTERASE UR-ACNC: NEGATIVE — SIGNIFICANT CHANGE UP
MAGNESIUM SERPL-MCNC: 2.2 MG/DL — SIGNIFICANT CHANGE UP (ref 1.6–2.6)
MCHC RBC-ENTMCNC: 33 GM/DL — SIGNIFICANT CHANGE UP (ref 32–36)
MCHC RBC-ENTMCNC: 33.1 PG — SIGNIFICANT CHANGE UP (ref 27–34)
MCV RBC AUTO: 100.3 FL — HIGH (ref 80–100)
NITRITE UR-MCNC: NEGATIVE — SIGNIFICANT CHANGE UP
NRBC # BLD: 0 /100 WBCS — SIGNIFICANT CHANGE UP (ref 0–0)
PH UR: 7 — SIGNIFICANT CHANGE UP (ref 5–8)
PHOSPHATE SERPL-MCNC: 3.2 MG/DL — SIGNIFICANT CHANGE UP (ref 2.5–4.5)
PLATELET # BLD AUTO: 186 K/UL — SIGNIFICANT CHANGE UP (ref 150–400)
POTASSIUM SERPL-MCNC: 4 MMOL/L — SIGNIFICANT CHANGE UP (ref 3.5–5.3)
POTASSIUM SERPL-SCNC: 4 MMOL/L — SIGNIFICANT CHANGE UP (ref 3.5–5.3)
PROT UR-MCNC: ABNORMAL MG/DL
RBC # BLD: 3.66 M/UL — LOW (ref 4.2–5.8)
RBC # FLD: 13.2 % — SIGNIFICANT CHANGE UP (ref 10.3–14.5)
SODIUM SERPL-SCNC: 138 MMOL/L — SIGNIFICANT CHANGE UP (ref 135–145)
SP GR SPEC: 1.01 — SIGNIFICANT CHANGE UP (ref 1–1.03)
UROBILINOGEN FLD QL: 0.2 E.U./DL — SIGNIFICANT CHANGE UP
WBC # BLD: 8.7 K/UL — SIGNIFICANT CHANGE UP (ref 3.8–10.5)
WBC # FLD AUTO: 8.7 K/UL — SIGNIFICANT CHANGE UP (ref 3.8–10.5)

## 2019-02-16 PROCEDURE — 71045 X-RAY EXAM CHEST 1 VIEW: CPT | Mod: 26

## 2019-02-16 PROCEDURE — 36415 COLL VENOUS BLD VENIPUNCTURE: CPT

## 2019-02-16 RX ORDER — HYDROMORPHONE HYDROCHLORIDE 2 MG/ML
1 INJECTION INTRAMUSCULAR; INTRAVENOUS; SUBCUTANEOUS
Qty: 0 | Refills: 0 | Status: DISCONTINUED | OUTPATIENT
Start: 2019-02-16 | End: 2019-02-18

## 2019-02-16 RX ORDER — OXYCODONE HYDROCHLORIDE 5 MG/1
15 TABLET ORAL EVERY 12 HOURS
Qty: 0 | Refills: 0 | Status: DISCONTINUED | OUTPATIENT
Start: 2019-02-16 | End: 2019-02-18

## 2019-02-16 RX ADMIN — OXYCODONE AND ACETAMINOPHEN 2 TABLET(S): 5; 325 TABLET ORAL at 21:32

## 2019-02-16 RX ADMIN — Medication 100 MILLIGRAM(S): at 15:38

## 2019-02-16 RX ADMIN — OXYCODONE AND ACETAMINOPHEN 2 TABLET(S): 5; 325 TABLET ORAL at 22:32

## 2019-02-16 RX ADMIN — Medication 50 MILLIGRAM(S): at 21:32

## 2019-02-16 RX ADMIN — OXYCODONE HYDROCHLORIDE 15 MILLIGRAM(S): 5 TABLET ORAL at 06:09

## 2019-02-16 RX ADMIN — OXYCODONE AND ACETAMINOPHEN 2 TABLET(S): 5; 325 TABLET ORAL at 05:48

## 2019-02-16 RX ADMIN — OXYCODONE AND ACETAMINOPHEN 2 TABLET(S): 5; 325 TABLET ORAL at 04:48

## 2019-02-16 RX ADMIN — OXYCODONE HYDROCHLORIDE 15 MILLIGRAM(S): 5 TABLET ORAL at 07:09

## 2019-02-16 RX ADMIN — ENOXAPARIN SODIUM 40 MILLIGRAM(S): 100 INJECTION SUBCUTANEOUS at 21:32

## 2019-02-16 RX ADMIN — Medication 100 MILLIGRAM(S): at 06:09

## 2019-02-16 RX ADMIN — PANTOPRAZOLE SODIUM 40 MILLIGRAM(S): 20 TABLET, DELAYED RELEASE ORAL at 12:43

## 2019-02-16 RX ADMIN — OXYCODONE HYDROCHLORIDE 15 MILLIGRAM(S): 5 TABLET ORAL at 19:00

## 2019-02-16 RX ADMIN — OXYCODONE AND ACETAMINOPHEN 1 TABLET(S): 5; 325 TABLET ORAL at 12:00

## 2019-02-16 RX ADMIN — Medication 50 MILLIGRAM(S): at 06:09

## 2019-02-16 RX ADMIN — Medication 100 MILLIGRAM(S): at 21:32

## 2019-02-16 RX ADMIN — Medication 50 MILLIGRAM(S): at 15:38

## 2019-02-16 RX ADMIN — OXYCODONE HYDROCHLORIDE 15 MILLIGRAM(S): 5 TABLET ORAL at 18:17

## 2019-02-16 RX ADMIN — OXYCODONE AND ACETAMINOPHEN 1 TABLET(S): 5; 325 TABLET ORAL at 11:03

## 2019-02-16 RX ADMIN — SENNA PLUS 2 TABLET(S): 8.6 TABLET ORAL at 21:32

## 2019-02-16 NOTE — PROCEDURE NOTE - GENERAL PROCEDURE DETAILS
Hemovac incision site was prepped in a sterile fashion. Anchoring suture was first removed. Hemovac catheter was then removed in its entirety without resistance. Steri strips applied at incision site. No further drainage noted.

## 2019-02-16 NOTE — PROGRESS NOTE ADULT - SUBJECTIVE AND OBJECTIVE BOX
Neurology Follow up note    Name  MISTY PARSONS    HPI:  53 year old, right handed, man with no pertinent past medical history who presents to [facility] today for neurosurgical procedure of cervical spine. He reported to [place] ED with reports of progressive worsening neck and back pain radiating to bilateral lower extremities with associated numbness/tingling. He reports bilateral leg weakness and significant gait disturbance. He uses a cane to ambulate. Denies bowel/bladder incontinence. MRI cervical spine indicates severe cervical stenosis at C6-C7 with cervical cord compression.  His symptoms are greatly affecting his quality of life. He can no longer work or perform daily routine activities due to severity of symptoms (12 Feb 2019 13:18)      Interval History - neck pain - no new radicular symptoms - no new tremors         REVIEW OF SYSTEMS    Vital Signs Last 24 Hrs  T(C): 36.9 (16 Feb 2019 09:15), Max: 38.9 (15 Feb 2019 21:45)  T(F): 98.4 (16 Feb 2019 09:15), Max: 102 (15 Feb 2019 21:45)  HR: 105 (16 Feb 2019 09:15) (89 - 122)  BP: 91/63 (16 Feb 2019 09:15) (91/63 - 121/74)  BP(mean): --  RR: 18 (16 Feb 2019 09:15) (15 - 18)  SpO2: 94% (16 Feb 2019 09:15) (94% - 98%)    Physical Exam-     Mental Status- awake and alert    Cranial Nerves- full EOM    Gait and station- n/a    Motor- moves all 4 extremities    Reflexes- decreased    Sensation- no sensory level    Coordination- no tremors    Vascular - no bruits    Medications  acetaminophen   Tablet .. 650 milliGRAM(s) Oral every 6 hours PRN  aluminum hydroxide/magnesium hydroxide/simethicone Suspension 30 milliLiter(s) Oral every 6 hours PRN  docusate sodium 100 milliGRAM(s) Oral three times a day  enoxaparin Injectable 40 milliGRAM(s) SubCutaneous at bedtime  HYDROmorphone  Injectable 1 milliGRAM(s) IV Push every 3 hours PRN  ondansetron Injectable 4 milliGRAM(s) IV Push every 6 hours PRN  oxyCODONE    5 mG/acetaminophen 325 mG 1 Tablet(s) Oral every 4 hours PRN  oxyCODONE    5 mG/acetaminophen 325 mG 2 Tablet(s) Oral every 4 hours PRN  oxyCODONE  ER Tablet 15 milliGRAM(s) Oral every 12 hours  pantoprazole    Tablet 40 milliGRAM(s) Oral daily  pregabalin 50 milliGRAM(s) Oral every 8 hours  senna 2 Tablet(s) Oral at bedtime      Lab      Radiology    Assessment- Cervical radiculopathy    Plan Rehab and pain
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
## 2019-02-17 LAB
ANION GAP SERPL CALC-SCNC: 11 MMOL/L — SIGNIFICANT CHANGE UP (ref 5–17)
BUN SERPL-MCNC: 18 MG/DL — SIGNIFICANT CHANGE UP (ref 7–23)
CALCIUM SERPL-MCNC: 9.2 MG/DL — SIGNIFICANT CHANGE UP (ref 8.4–10.5)
CHLORIDE SERPL-SCNC: 95 MMOL/L — LOW (ref 96–108)
CO2 SERPL-SCNC: 30 MMOL/L — SIGNIFICANT CHANGE UP (ref 22–31)
CREAT SERPL-MCNC: 1.34 MG/DL — HIGH (ref 0.5–1.3)
GLUCOSE SERPL-MCNC: 118 MG/DL — HIGH (ref 70–99)
HCT VFR BLD CALC: 35.8 % — LOW (ref 39–50)
HGB BLD-MCNC: 11.8 G/DL — LOW (ref 13–17)
MAGNESIUM SERPL-MCNC: 2.3 MG/DL — SIGNIFICANT CHANGE UP (ref 1.6–2.6)
MCHC RBC-ENTMCNC: 32.7 PG — SIGNIFICANT CHANGE UP (ref 27–34)
MCHC RBC-ENTMCNC: 33 GM/DL — SIGNIFICANT CHANGE UP (ref 32–36)
MCV RBC AUTO: 99.2 FL — SIGNIFICANT CHANGE UP (ref 80–100)
NRBC # BLD: 0 /100 WBCS — SIGNIFICANT CHANGE UP (ref 0–0)
PHOSPHATE SERPL-MCNC: 4.3 MG/DL — SIGNIFICANT CHANGE UP (ref 2.5–4.5)
PLATELET # BLD AUTO: 197 K/UL — SIGNIFICANT CHANGE UP (ref 150–400)
POTASSIUM SERPL-MCNC: 3.7 MMOL/L — SIGNIFICANT CHANGE UP (ref 3.5–5.3)
POTASSIUM SERPL-SCNC: 3.7 MMOL/L — SIGNIFICANT CHANGE UP (ref 3.5–5.3)
RBC # BLD: 3.61 M/UL — LOW (ref 4.2–5.8)
RBC # FLD: 13 % — SIGNIFICANT CHANGE UP (ref 10.3–14.5)
SODIUM SERPL-SCNC: 136 MMOL/L — SIGNIFICANT CHANGE UP (ref 135–145)
WBC # BLD: 6.65 K/UL — SIGNIFICANT CHANGE UP (ref 3.8–10.5)
WBC # FLD AUTO: 6.65 K/UL — SIGNIFICANT CHANGE UP (ref 3.8–10.5)

## 2019-02-17 PROCEDURE — 72040 X-RAY EXAM NECK SPINE 2-3 VW: CPT | Mod: 26

## 2019-02-17 RX ADMIN — Medication 50 MILLIGRAM(S): at 05:14

## 2019-02-17 RX ADMIN — SENNA PLUS 2 TABLET(S): 8.6 TABLET ORAL at 21:23

## 2019-02-17 RX ADMIN — OXYCODONE AND ACETAMINOPHEN 2 TABLET(S): 5; 325 TABLET ORAL at 10:20

## 2019-02-17 RX ADMIN — OXYCODONE HYDROCHLORIDE 15 MILLIGRAM(S): 5 TABLET ORAL at 06:14

## 2019-02-17 RX ADMIN — Medication 100 MILLIGRAM(S): at 18:29

## 2019-02-17 RX ADMIN — ENOXAPARIN SODIUM 40 MILLIGRAM(S): 100 INJECTION SUBCUTANEOUS at 21:23

## 2019-02-17 RX ADMIN — OXYCODONE AND ACETAMINOPHEN 2 TABLET(S): 5; 325 TABLET ORAL at 09:02

## 2019-02-17 RX ADMIN — PANTOPRAZOLE SODIUM 40 MILLIGRAM(S): 20 TABLET, DELAYED RELEASE ORAL at 12:18

## 2019-02-17 RX ADMIN — Medication 100 MILLIGRAM(S): at 05:14

## 2019-02-17 RX ADMIN — OXYCODONE HYDROCHLORIDE 15 MILLIGRAM(S): 5 TABLET ORAL at 18:19

## 2019-02-17 RX ADMIN — OXYCODONE HYDROCHLORIDE 15 MILLIGRAM(S): 5 TABLET ORAL at 18:18

## 2019-02-17 RX ADMIN — Medication 50 MILLIGRAM(S): at 18:27

## 2019-02-17 RX ADMIN — OXYCODONE HYDROCHLORIDE 15 MILLIGRAM(S): 5 TABLET ORAL at 05:14

## 2019-02-18 ENCOUNTER — TRANSCRIPTION ENCOUNTER (OUTPATIENT)
Age: 54
End: 2019-02-18

## 2019-02-18 VITALS
HEART RATE: 99 BPM | OXYGEN SATURATION: 95 % | SYSTOLIC BLOOD PRESSURE: 93 MMHG | DIASTOLIC BLOOD PRESSURE: 66 MMHG | RESPIRATION RATE: 16 BRPM | TEMPERATURE: 98 F

## 2019-02-18 LAB
ANION GAP SERPL CALC-SCNC: 14 MMOL/L — SIGNIFICANT CHANGE UP (ref 5–17)
BUN SERPL-MCNC: 21 MG/DL — SIGNIFICANT CHANGE UP (ref 7–23)
CALCIUM SERPL-MCNC: 9.3 MG/DL — SIGNIFICANT CHANGE UP (ref 8.4–10.5)
CHLORIDE SERPL-SCNC: 94 MMOL/L — LOW (ref 96–108)
CO2 SERPL-SCNC: 24 MMOL/L — SIGNIFICANT CHANGE UP (ref 22–31)
CREAT SERPL-MCNC: 1.34 MG/DL — HIGH (ref 0.5–1.3)
GLUCOSE SERPL-MCNC: 114 MG/DL — HIGH (ref 70–99)
HCT VFR BLD CALC: 37.4 % — LOW (ref 39–50)
HGB BLD-MCNC: 12.4 G/DL — LOW (ref 13–17)
MAGNESIUM SERPL-MCNC: 2.4 MG/DL — SIGNIFICANT CHANGE UP (ref 1.6–2.6)
MCHC RBC-ENTMCNC: 32.5 PG — SIGNIFICANT CHANGE UP (ref 27–34)
MCHC RBC-ENTMCNC: 33.2 GM/DL — SIGNIFICANT CHANGE UP (ref 32–36)
MCV RBC AUTO: 98.2 FL — SIGNIFICANT CHANGE UP (ref 80–100)
NRBC # BLD: 0 /100 WBCS — SIGNIFICANT CHANGE UP (ref 0–0)
OSMOLALITY SERPL: 286 MOSM/KG — SIGNIFICANT CHANGE UP (ref 280–301)
PHOSPHATE SERPL-MCNC: 3.6 MG/DL — SIGNIFICANT CHANGE UP (ref 2.5–4.5)
PLATELET # BLD AUTO: 239 K/UL — SIGNIFICANT CHANGE UP (ref 150–400)
POTASSIUM SERPL-MCNC: 4.2 MMOL/L — SIGNIFICANT CHANGE UP (ref 3.5–5.3)
POTASSIUM SERPL-SCNC: 4.2 MMOL/L — SIGNIFICANT CHANGE UP (ref 3.5–5.3)
RBC # BLD: 3.81 M/UL — LOW (ref 4.2–5.8)
RBC # FLD: 13.1 % — SIGNIFICANT CHANGE UP (ref 10.3–14.5)
SODIUM SERPL-SCNC: 132 MMOL/L — LOW (ref 135–145)
WBC # BLD: 7.2 K/UL — SIGNIFICANT CHANGE UP (ref 3.8–10.5)
WBC # FLD AUTO: 7.2 K/UL — SIGNIFICANT CHANGE UP (ref 3.8–10.5)

## 2019-02-18 RX ORDER — ENOXAPARIN SODIUM 100 MG/ML
40 INJECTION SUBCUTANEOUS
Qty: 0 | Refills: 0 | COMMUNITY
Start: 2019-02-18

## 2019-02-18 RX ORDER — BENZOCAINE AND MENTHOL 5; 1 G/100ML; G/100ML
1 LIQUID ORAL
Qty: 0 | Refills: 0 | COMMUNITY
Start: 2019-02-18

## 2019-02-18 RX ORDER — DIAZEPAM 5 MG
1 TABLET ORAL
Qty: 0 | Refills: 0 | COMMUNITY
Start: 2019-02-18

## 2019-02-18 RX ORDER — OXYCODONE HYDROCHLORIDE 5 MG/1
1 TABLET ORAL
Qty: 0 | Refills: 0 | COMMUNITY
Start: 2019-02-18

## 2019-02-18 RX ORDER — SODIUM CHLORIDE 9 MG/ML
2 INJECTION INTRAMUSCULAR; INTRAVENOUS; SUBCUTANEOUS EVERY 6 HOURS
Qty: 0 | Refills: 0 | Status: DISCONTINUED | OUTPATIENT
Start: 2019-02-18 | End: 2019-02-18

## 2019-02-18 RX ORDER — SODIUM CHLORIDE 9 MG/ML
2 INJECTION INTRAMUSCULAR; INTRAVENOUS; SUBCUTANEOUS
Qty: 0 | Refills: 0 | COMMUNITY
Start: 2019-02-18

## 2019-02-18 RX ORDER — ONDANSETRON 8 MG/1
4 TABLET, FILM COATED ORAL
Qty: 0 | Refills: 0 | COMMUNITY
Start: 2019-02-18

## 2019-02-18 RX ORDER — SENNA PLUS 8.6 MG/1
2 TABLET ORAL
Qty: 0 | Refills: 0 | COMMUNITY
Start: 2019-02-18

## 2019-02-18 RX ORDER — HYDROMORPHONE HYDROCHLORIDE 2 MG/ML
1 INJECTION INTRAMUSCULAR; INTRAVENOUS; SUBCUTANEOUS
Qty: 0 | Refills: 0 | COMMUNITY
Start: 2019-02-18

## 2019-02-18 RX ORDER — BENZOCAINE AND MENTHOL 5; 1 G/100ML; G/100ML
1 LIQUID ORAL
Qty: 0 | Refills: 0 | Status: DISCONTINUED | OUTPATIENT
Start: 2019-02-18 | End: 2019-02-18

## 2019-02-18 RX ORDER — DOCUSATE SODIUM 100 MG
1 CAPSULE ORAL
Qty: 0 | Refills: 0 | COMMUNITY
Start: 2019-02-18

## 2019-02-18 RX ORDER — DIAZEPAM 5 MG
5 TABLET ORAL EVERY 8 HOURS
Qty: 0 | Refills: 0 | Status: DISCONTINUED | OUTPATIENT
Start: 2019-02-18 | End: 2019-02-18

## 2019-02-18 RX ADMIN — BENZOCAINE AND MENTHOL 1 LOZENGE: 5; 1 LIQUID ORAL at 12:19

## 2019-02-18 RX ADMIN — Medication 50 MILLIGRAM(S): at 05:22

## 2019-02-18 RX ADMIN — SODIUM CHLORIDE 2 GRAM(S): 9 INJECTION INTRAMUSCULAR; INTRAVENOUS; SUBCUTANEOUS at 12:19

## 2019-02-18 RX ADMIN — OXYCODONE AND ACETAMINOPHEN 2 TABLET(S): 5; 325 TABLET ORAL at 12:19

## 2019-02-18 RX ADMIN — Medication 100 MILLIGRAM(S): at 12:19

## 2019-02-18 RX ADMIN — OXYCODONE HYDROCHLORIDE 15 MILLIGRAM(S): 5 TABLET ORAL at 06:22

## 2019-02-18 RX ADMIN — Medication 50 MILLIGRAM(S): at 12:19

## 2019-02-18 RX ADMIN — Medication 100 MILLIGRAM(S): at 05:22

## 2019-02-18 RX ADMIN — PANTOPRAZOLE SODIUM 40 MILLIGRAM(S): 20 TABLET, DELAYED RELEASE ORAL at 12:19

## 2019-02-18 RX ADMIN — OXYCODONE HYDROCHLORIDE 15 MILLIGRAM(S): 5 TABLET ORAL at 05:22

## 2019-02-18 NOTE — PROGRESS NOTE ADULT - PROVIDER SPECIALTY LIST ADULT
Neurology
Neurosurgery
Neurology

## 2019-02-18 NOTE — DISCHARGE NOTE ADULT - CARE PROVIDER_API CALL
Shayan Rosas)  Neurosurgery  [address], [place] [facility]  Phone: [phone]  Fax: [phone]  Follow Up Time:

## 2019-02-18 NOTE — PROGRESS NOTE ADULT - SUBJECTIVE AND OBJECTIVE BOX
Neurology Follow up note    Name  MISTY PARSONS    HPI:  53 year old, right handed, man with no pertinent past medical history who presents to [facility] today for neurosurgical procedure of cervical spine. He reported to [place] ED with reports of progressive worsening neck and back pain radiating to bilateral lower extremities with associated numbness/tingling. He reports bilateral leg weakness and significant gait disturbance. He uses a cane to ambulate. Denies bowel/bladder incontinence. MRI cervical spine indicates severe cervical stenosis at C6-C7 with cervical cord compression.  His symptoms are greatly affecting his quality of life. He can no longer work or perform daily routine activities due to severity of symptoms (12 Feb 2019 13:18)      Interval History - neck pain - no dysphagia        REVIEW OF SYSTEMS    Vital Signs Last 24 Hrs  T(C): 36.8 (18 Feb 2019 04:54), Max: 37.5 (17 Feb 2019 09:00)  T(F): 98.2 (18 Feb 2019 04:54), Max: 99.5 (17 Feb 2019 09:00)  HR: 90 (18 Feb 2019 05:01) (80 - 117)  BP: 111/67 (18 Feb 2019 04:54) (96/66 - 125/82)  BP(mean): --  RR: 16 (18 Feb 2019 05:01) (16 - 18)  SpO2: 96% (18 Feb 2019 05:01) (95% - 98%)    Physical Exam-     Mental Status- awake     Cranial Nerves- full EOM    Gait and station- n/a    Motor- moves all 4 extremities    Reflexes- decreased    Sensation- no sensory level    Coordination- no tremors    Vascular - no bruts    Medications  acetaminophen   Tablet .. 650 milliGRAM(s) Oral every 6 hours PRN  aluminum hydroxide/magnesium hydroxide/simethicone Suspension 30 milliLiter(s) Oral every 6 hours PRN  bisacodyl Suppository 10 milliGRAM(s) Rectal daily PRN  diazepam    Tablet 5 milliGRAM(s) Oral every 8 hours PRN  docusate sodium 100 milliGRAM(s) Oral three times a day  enoxaparin Injectable 40 milliGRAM(s) SubCutaneous at bedtime  HYDROmorphone  Injectable 1 milliGRAM(s) IV Push every 3 hours PRN  ondansetron Injectable 4 milliGRAM(s) IV Push every 6 hours PRN  oxyCODONE    5 mG/acetaminophen 325 mG 1 Tablet(s) Oral every 4 hours PRN  oxyCODONE    5 mG/acetaminophen 325 mG 2 Tablet(s) Oral every 4 hours PRN  oxyCODONE  ER Tablet 15 milliGRAM(s) Oral every 12 hours  pantoprazole    Tablet 40 milliGRAM(s) Oral daily  pregabalin 50 milliGRAM(s) Oral every 8 hours  senna 2 Tablet(s) Oral at bedtime      Lab      Radiology    Assessment- Cervical radiculopathy    Plan Rehab and pain

## 2019-02-18 NOTE — PROGRESS NOTE ADULT - SUBJECTIVE AND OBJECTIVE BOX
HPI:  53 year old, right handed, man with no pertinent past medical history who presents to [facility] today for neurosurgical procedure of cervical spine. He reported to [place] ED with reports of progressive worsening neck and back pain radiating to bilateral lower extremities with associated numbness/tingling. He reports bilateral leg weakness and significant gait disturbance. He uses a cane to ambulate. Denies bowel/bladder incontinence. MRI cervical spine indicates severe cervical stenosis at C6-C7 with cervical cord compression.  His symptoms are greatly affecting his quality of life. He can no longer work or perform daily routine activities due to severity of symptoms (12 Feb 2019 13:18)    Hospital Course:  POD# 0 S/P C6-C7 laminectomy and 5-T1 posterior fusion.   2/14 POD#2 Hmvc in place, [place]eller J to be delivered, Xray when drain is out, AR placement, PCA Dilaudid  2/15: MICHAEL overnight. Neuro stable. Awaiting AR placement.  2/16: Pancultured overnight for fever. Neuro stable.  2/18 POD#5 MICHAEL overnight, Teller J  on, Hyponatremia w/u pending, started NaCl tabs, AR placement, added Valium PRN muscle spasm   02-18    132<L>  |  94<L>  |  21  ----------------------------<  114<H>  4.2   |  24  |  1.34<H>    Ca    9.3      18 Feb 2019 06:30  Phos  3.6     02-18  Mg     2.4     02-18      ICU Vital Signs Last 24 Hrs  T(C): 36.8 (18 Feb 2019 04:54), Max: 37.5 (17 Feb 2019 09:00)  T(F): 98.2 (18 Feb 2019 04:54), Max: 99.5 (17 Feb 2019 09:00)  HR: 90 (18 Feb 2019 05:01) (80 - 117)  BP: 111/67 (18 Feb 2019 04:54) (96/66 - 125/82)  BP(mean): --  ABP: --  ABP(mean): --  RR: 16 (18 Feb 2019 05:01) (16 - 18)  SpO2: 96% (18 Feb 2019 05:01) (95% - 98%)                          12.4   7.20  )-----------( 239      ( 18 Feb 2019 06:30 )             37.4       PHYSICAL EXAM:  Neurological: AAOx3, NAD, fluent speech (Latvian-speaking)  CNII-XII: PERRL, EOMI, visual fields intact, face symmetric   Motor 5/5 throughout B/L, no pronator drift   SILT   Neck: dressing C/D/I, + HMV  Cervical [place] J collar in place      ASSESSMENT:  53y Male POD# 4 S/P C6-C7 laminectomy and C5-T1 posterior fusion     CERVICALSTENO M47.12 M53  CERVICALSTENO M47.12 M53.  CERVICALSTENO M47.12 M53.2  Handoff  MEWS Score  Cervical stenosis of spine  Cervical stenosis of spinal canal  Cervical stenosis of spinal canal  Cervical stenosis of spine  Laminectomy  No significant past surgical history      PLAN:  - Start NaCl tab 2a6 for hyponatremia, f/u Labs tomorrow MA, trend Serum Na  - f/u Urine Osm, Serum Osm, Sodium Random Urine, UA  r/o Salt wasting vs SIADH  - Pain control: dilaudid, lyrica, percocet  - Spine checks   - PT/OT evaluation   - PT eval - AR placement  - keep Teller J in place   - Monitor HMV output   - AP/Lateral XR once HMV out   - BiPAP/CPAP overnight for SHALONDA   - Bowel reg   - DVT prophylaxis: SCDs, SQL   - D/w Dr. Rosas

## 2019-02-18 NOTE — DISCHARGE NOTE ADULT - HOSPITAL COURSE
Hospital Course:  POD# 0 S/P C6-C7 laminectomy and 5-T1 posterior fusion.   2/14 POD#2 Hmvc in place, [place] J to be delivered, Xray when drain is out, AR placement, PCA Dilaudid  2/15: MICHAEL overnight. Neuro stable. Awaiting AR placement.  2/16: Pancultured overnight for fever. Neuro stable.  2/18 POD#5 MICHAEL overnight, [place] J  on at all times, Hyponatremia w/u pending, started NaCl tabs, AR placement, added Valium PRN muscle spasm

## 2019-02-18 NOTE — DISCHARGE NOTE ADULT - PLAN OF CARE
f/u with Dr. Rosas After leaving the hospital, please follow these instructions:  • Keep your wound clean and dry.  Watch your incision for signs of infection such as: Redness,Swelling and tenderness and Drainage.  You may shower briefly, unless you told not to by your doctor.  Cover your entire incision with a waterproof bandage to make sure it does not get wet. If it gets wet, dry it off.  No tub baths or swimming for two weeks.  Do not strain neck  Take pain medicine as prescribed.  You may find it helpful to take it for morning stiffness or for soreness when trying to  sleep.  Pain medication can cause constipation. Eating food with fiber such as fruits and  vegetables, and drinking liquids may help prevent constipation.  If you are required to wear a cervical collar, please wear it as instructed.  Call you doctor immediately if you have:  Any new numbness, tingling, or weakness in your arms and legs.  • Pain that is getting worse, or not going away after taking pain medicine.  • Fever of 101° F or more.

## 2019-02-18 NOTE — DISCHARGE NOTE ADULT - PATIENT PORTAL LINK FT
You can access the [facility] Patient Portal, offered by [facility], by registering with the following website: [email or web address]

## 2019-02-18 NOTE — DISCHARGE NOTE ADULT - MEDICATION SUMMARY - MEDICATIONS TO TAKE
I will START or STAY ON the medications listed below when I get home from the hospital:    HYDROmorphone  -- 1 milligram(s) intravenous every 3 hours, As Needed breakthrough pain  -- Indication: For breakthrough pain    oxycodone-acetaminophen 5 mg-325 mg oral tablet  -- 1 tab(s) by mouth every 4 hours, As needed, Moderate Pain (4 - 6)  -- Indication: For moderate pain    oxycodone-acetaminophen 5 mg-325 mg oral tablet  -- 2 tab(s) by mouth every 4 hours, As needed, Severe Pain (7 - 10)  -- Indication: For severe pain    oxyCODONE 15 mg oral tablet, extended release  -- 1 tab(s) by mouth every 12 hours  -- Indication: For long acting pain control    enoxaparin  -- 40 milligram(s) subcutaneous once a day (at bedtime)  -- Indication: For DVT prophylaxis    pregabalin 50 mg oral capsule  -- 1 cap(s) by mouth every 8 hours  -- Indication: For Neuropathy    diazePAM 5 mg oral tablet  -- 1 tab(s) by mouth every 8 hours, As needed, muscle spasm  -- Indication: For muscle spasm as needed    ondansetron 2 mg/mL injectable solution  -- 4 milligram(s) injectable every 6 hours, As Needed - for nausea  -- Indication: For Nausea as neede    bisacodyl 10 mg rectal suppository  -- 1 suppository(ies) rectally once a day, As needed, Constipation  -- Indication: For for constipation as needed    docusate sodium 100 mg oral capsule  -- 1 cap(s) by mouth 3 times a day  -- Indication: For while taking pain meds, laxative    senna oral tablet  -- 2 tab(s) by mouth once a day (at bedtime)  -- Indication: For stool softener while taking pain meds    sodium chloride 1 g oral tablet  -- 2 tab(s) by mouth every 6 hours  -- Indication: For salt tabs     benzocaine-menthol 15 mg-3.6 mg mucous membrane lozenge  -- 1 lozenge by mouth 3 times a day, As Needed for sore throat  -- Indication: For sore throat as needed    omeprazole 40 mg oral delayed release capsule  -- 1 cap(s) by mouth once a day  -- Indication: For gerd I will START or STAY ON the medications listed below when I get home from the hospital:    oxycodone-acetaminophen 5 mg-325 mg oral tablet  -- 1 tab(s) by mouth every 4 hours, As needed, Moderate Pain (4 - 6)  -- Indication: For moderate pain    oxycodone-acetaminophen 5 mg-325 mg oral tablet  -- 2 tab(s) by mouth every 4 hours, As needed, Severe Pain (7 - 10)  -- Indication: For severe pain    oxyCODONE 15 mg oral tablet, extended release  -- 1 tab(s) by mouth every 12 hours  -- Indication: For long acting pain control    enoxaparin  -- 40 milligram(s) subcutaneous once a day (at bedtime)  -- Indication: For DVT prophylaxis    pregabalin 50 mg oral capsule  -- 1 cap(s) by mouth every 8 hours  -- Indication: For Neuropathy    diazePAM 5 mg oral tablet  -- 1 tab(s) by mouth every 8 hours, As needed, muscle spasm  -- Indication: For muscle spasm as needed    ondansetron 2 mg/mL injectable solution  -- 4 milligram(s) injectable every 6 hours, As Needed - for nausea  -- Indication: For Nausea as neede    bisacodyl 10 mg rectal suppository  -- 1 suppository(ies) rectally once a day, As needed, Constipation  -- Indication: For for constipation as needed    docusate sodium 100 mg oral capsule  -- 1 cap(s) by mouth 3 times a day  -- Indication: For while taking pain meds, laxative    senna oral tablet  -- 2 tab(s) by mouth once a day (at bedtime)  -- Indication: For stool softener while taking pain meds    sodium chloride 1 g oral tablet  -- 2 tab(s) by mouth every 6 hours  -- Indication: For salt tabs     benzocaine-menthol 15 mg-3.6 mg mucous membrane lozenge  -- 1 lozenge by mouth 3 times a day, As Needed for sore throat  -- Indication: For sore throat as needed    omeprazole 40 mg oral delayed release capsule  -- 1 cap(s) by mouth once a day  -- Indication: For gerd

## 2019-02-18 NOTE — DISCHARGE NOTE ADULT - CARE PLAN
Principal Discharge DX:	Cervical stenosis of spine  Goal:	f/u with Dr. Rosas  Assessment and plan of treatment:	After leaving the hospital, please follow these instructions:  • Keep your wound clean and dry.  Watch your incision for signs of infection such as: Redness,Swelling and tenderness and Drainage.  You may shower briefly, unless you told not to by your doctor.  Cover your entire incision with a waterproof bandage to make sure it does not get wet. If it gets wet, dry it off.  No tub baths or swimming for two weeks.  Do not strain neck  Take pain medicine as prescribed.  You may find it helpful to take it for morning stiffness or for soreness when trying to  sleep.  Pain medication can cause constipation. Eating food with fiber such as fruits and  vegetables, and drinking liquids may help prevent constipation.  If you are required to wear a cervical collar, please wear it as instructed.  Call you doctor immediately if you have:  Any new numbness, tingling, or weakness in your arms and legs.  • Pain that is getting worse, or not going away after taking pain medicine.  • Fever of 101° F or more.

## 2019-02-21 LAB
CULTURE RESULTS: SIGNIFICANT CHANGE UP
SPECIMEN SOURCE: SIGNIFICANT CHANGE UP

## 2019-02-22 DIAGNOSIS — G47.33 OBSTRUCTIVE SLEEP APNEA (ADULT) (PEDIATRIC): ICD-10-CM

## 2019-02-22 DIAGNOSIS — M48.02 SPINAL STENOSIS, CERVICAL REGION: ICD-10-CM

## 2019-02-22 DIAGNOSIS — M53.2X2 SPINAL INSTABILITIES, CERVICAL REGION: ICD-10-CM

## 2019-02-22 DIAGNOSIS — G99.2 MYELOPATHY IN DISEASES CLASSIFIED ELSEWHERE: ICD-10-CM

## 2019-02-22 DIAGNOSIS — M54.12 RADICULOPATHY, CERVICAL REGION: ICD-10-CM

## 2019-02-22 DIAGNOSIS — E87.1 HYPO-OSMOLALITY AND HYPONATREMIA: ICD-10-CM

## 2019-02-27 PROBLEM — M48.02 SPINAL STENOSIS, CERVICAL REGION: Chronic | Status: ACTIVE | Noted: 2019-02-12

## 2019-03-11 ENCOUNTER — OUTPATIENT (OUTPATIENT)
Dept: OUTPATIENT SERVICES | Facility: HOSPITAL | Age: 54
LOS: 1 days | End: 2019-03-11
Payer: COMMERCIAL

## 2019-03-11 ENCOUNTER — APPOINTMENT (OUTPATIENT)
Dept: NEUROSURGERY | Facility: CLINIC | Age: 54
End: 2019-03-11
Payer: MEDICAID

## 2019-03-11 VITALS
RESPIRATION RATE: 16 BRPM | SYSTOLIC BLOOD PRESSURE: 108 MMHG | WEIGHT: 175 LBS | HEIGHT: 69 IN | BODY MASS INDEX: 25.92 KG/M2 | DIASTOLIC BLOOD PRESSURE: 76 MMHG | HEART RATE: 87 BPM | TEMPERATURE: 97.6 F | OXYGEN SATURATION: 96 %

## 2019-03-11 DIAGNOSIS — G95.20 UNSPECIFIED CORD COMPRESSION: ICD-10-CM

## 2019-03-11 PROCEDURE — 72040 X-RAY EXAM NECK SPINE 2-3 VW: CPT

## 2019-03-11 PROCEDURE — 72040 X-RAY EXAM NECK SPINE 2-3 VW: CPT | Mod: 26

## 2019-03-11 PROCEDURE — 99024 POSTOP FOLLOW-UP VISIT: CPT

## 2019-03-11 NOTE — REASON FOR VISIT
[Spouse] : spouse [[employer] ] : provided by [employer]   [de-identified] : Cervical laminectomy, facetectomy, and spinal cord decompression, C6 and C7; posterior segmental instrumentation with lateral mass/pedicle screws and cindy stabilization C5-T1; posterior lateral arthrodesis at C5-6, C6-7, and C7-T1, use of local harvest morselized bone autograft and allograft, use of framless stereotactic spinal navigation [de-identified] : 2/12/2019 [de-identified] : Reports he is doing well.\par Denies any signs of postop wound infection which could include but is not limited to redness/swelling/purulent drainage\par Denies CP/SOB/unilateral leg edema. Denies headaches, nausea, vomiting, dizziness, weakness. \par He is slowly introducing preop activities\par He was receiving physical therapy in the home but insurance is no longer covering at home physical therapy. \par \par Reports bilateral shoulder pain.\par He has been compliant with hard collar.\par

## 2019-03-11 NOTE — ASSESSMENT
[FreeTextEntry1] : Cervical x-rays done today reviewed by Dr. Rosas - hardware intact, no malfunction.\par Recommend CT cervical spine in three months (May 2019) to assess for fusion.\par Continue physical therapy for muscular strengthening, balance and coordination and gait training.\par Return to the office to see Dr. Rosas after CT cervical spine complete.\par \par Plan:\par \par 1. CT cervical spine (May 2019) - they will call the office in April 2019 for authorization and to schedule.\par 2. Return to office to see Dr. Rosas after CT cervical spine complete\par 3. Continue physical therapy\par \par Patient and patient's family verbalize agreement and understanding with plan.

## 2019-03-11 NOTE — PHYSICAL EXAM
[General Appearance - Alert] : alert [General Appearance - In No Acute Distress] : in no acute distress [Clean] : clean [Dry] : dry [Healing Well] : healing well [Intact] : intact [No Drainage] : without drainage [Normal Skin] : normal [Sclera] : the sclera and conjunctiva were normal [Outer Ear] : the ears and nose were normal in appearance [Neck Appearance] : the appearance of the neck was normal [] : no respiratory distress [Respiration, Rhythm And Depth] : normal respiratory rhythm and effort [Heart Rate And Rhythm] : heart rate was normal and rhythm regular [Abdomen Soft] : soft [Abnormal Walk] : normal gait [Skin Color & Pigmentation] : normal skin color and pigmentation [FreeTextEntry1] : Posterior cervical [FreeTextEntry6] : lower extremity strength 4/5

## 2019-03-11 NOTE — HISTORY OF PRESENT ILLNESS
[de-identified] : 53 year old, right handed, man with no pertinent past medical history who presents to [facility] today for neurosurgical evaluation of cervical spine. He reported to [place] ED with reports of progressive worsening neck and back pain radiating to bilateral lower extremities with associated numbness/tingling.  He reports bilateral leg weakness and significant gait disturbance. He uses a cane to ambulate. Denies bowel/bladder incontinence. MRI cervical spine indicates severe cervical stenosis at C6-C7 with cervical cord compression.\par \par His symptoms are greatly affecting his quality of life. He can no longer work or perform daily routine activities due to severity of symptoms.

## 2019-06-03 ENCOUNTER — OUTPATIENT (OUTPATIENT)
Dept: OUTPATIENT SERVICES | Facility: HOSPITAL | Age: 54
LOS: 1 days | End: 2019-06-03
Payer: COMMERCIAL

## 2019-06-03 ENCOUNTER — APPOINTMENT (OUTPATIENT)
Dept: CT IMAGING | Facility: HOSPITAL | Age: 54
End: 2019-06-03
Payer: MEDICAID

## 2019-06-03 ENCOUNTER — APPOINTMENT (OUTPATIENT)
Dept: NEUROSURGERY | Facility: CLINIC | Age: 54
End: 2019-06-03
Payer: MEDICAID

## 2019-06-03 VITALS
HEIGHT: 69 IN | DIASTOLIC BLOOD PRESSURE: 82 MMHG | HEART RATE: 74 BPM | SYSTOLIC BLOOD PRESSURE: 142 MMHG | OXYGEN SATURATION: 96 % | BODY MASS INDEX: 25.92 KG/M2 | RESPIRATION RATE: 16 BRPM | TEMPERATURE: 97.5 F | WEIGHT: 175 LBS

## 2019-06-03 DIAGNOSIS — M48.02 SPINAL STENOSIS, CERVICAL REGION: ICD-10-CM

## 2019-06-03 PROCEDURE — 72125 CT NECK SPINE W/O DYE: CPT | Mod: 26

## 2019-06-03 PROCEDURE — 72125 CT NECK SPINE W/O DYE: CPT

## 2019-06-03 PROCEDURE — 99215 OFFICE O/P EST HI 40 MIN: CPT

## 2019-06-11 NOTE — PHYSICAL EXAM
[General Appearance - In No Acute Distress] : in no acute distress [General Appearance - Alert] : alert [Dry] : dry [Clean] : clean [No Drainage] : without drainage [Intact] : intact [Well-Healed] : well-healed [Normal Skin] : normal [Motor Tone] : muscle tone was normal in all four extremities [Motor Strength] : muscle strength was normal in all four extremities [Sclera] : the sclera and conjunctiva were normal [Neck Appearance] : the appearance of the neck was normal [Outer Ear] : the ears and nose were normal in appearance [Heart Rate And Rhythm] : heart rate was normal and rhythm regular [] : no respiratory distress [Respiration, Rhythm And Depth] : normal respiratory rhythm and effort [Skin Color & Pigmentation] : normal skin color and pigmentation [FreeTextEntry6] : R [FreeTextEntry1] : uses cane to ambulate

## 2019-06-11 NOTE — ADDENDUM
[FreeTextEntry1] : Brina 3, 2019\par \par \par \par Re:	Rojelio Corona \par :	61\par MRN:  [id number]\par \par Mr. Darryl Sanchez is a 54-year-old man who underwent posterior cervical decompression and fusion with C6 and C7 laminectomy as well as C5 to T1 instrumented fusion on 2019.  Since this surgery, he has done extremely well.  He is now ambulating much better although he still uses a cane.  He is doing physical therapy three times per week.  He brings with him a followup CT scan of the cervical spine performed today, which shows that his hardware remains in good position, and fusion is occurring in the posterolateral segments of the spine.\par \par Overall, I am very pleased with his progress.  I have indicated that he should continue doing physical therapy.  Given that his CT scan shows evidence of fusion, I told him that it is okay to discontinue wearing the hard cervical collar at this point.\par \par We will plan for him to follow up with me in three months with a new cervical x-ray.\par \par \par \par \par Shayan Rosas M.D.\par  of Neurosurgery\par [facility] Department of Neurosurgery\par [facility]\par [address]\par Critical access hospital, Third Floor\[place] [address]\par Tel: [phone]\par Email:  [email or web address] \par \par ARNAV/[facility] #[id number]\par \par \par

## 2019-06-11 NOTE — HISTORY OF PRESENT ILLNESS
[de-identified] : 53 year old, right handed, man with no pertinent past medical history who presents to [facility] today for neurosurgical evaluation of cervical spine. He reported to [place] ED with reports of progressive worsening neck and back pain radiating to bilateral lower extremities with associated numbness/tingling.  He reports bilateral leg weakness and significant gait disturbance. He uses a cane to ambulate. Denies bowel/bladder incontinence. MRI cervical spine indicates severe cervical stenosis at C6-C7 with cervical cord compression.\par \par His symptoms are greatly affecting his quality of life. He can no longer work or perform daily routine activities due to severity of symptoms.

## 2019-06-11 NOTE — ASSESSMENT
[FreeTextEntry1] : CT cervical spine from 6/3/19 from today reviewed by Dr. Rosas, demonstrates bony fusion.\par Discontinue cervical collar.\par No Bend/Lift/Twist\par Gradually increase activities as tolerated\par Continue physical therapy for muscular strengthening.\par Notify MD or report to ED for worsening neurological s/s, including but not limited to weakness, incontinence gait disturbance.\par Return to the office in 3 months for evaluation (September 2019)\par \par Plan:\par \par 1. Continue physical therapy\par 2. Return to the office in 3 months \par \par Patient verbalizes agreement and understanding with plan.\par \par

## 2019-06-11 NOTE — REASON FOR VISIT
[FreeTextEntry1] : \par TODAY'S VISIT:\par Returns to review CT cervical spine done earlier today.\par He reports compliance with cervical collar.\par He reports intermittent bilateral tingling in forearms.\par He is currently in physical therapy and states he is doing well.\par Reports improvement in bilateral extremity strength. He reports RIGHT lower extremity strength is slightly weaker than LEFT.\par \par PREVIOUS OFFICE VISIT 3/11/19:\par Reports he is doing well.\par Denies any signs of postop wound infection which could include but is not limited to redness/swelling/purulent drainage\par Denies CP/SOB/unilateral leg edema. Denies headaches, nausea, vomiting, dizziness, weakness. \par He is slowly introducing preop activities\par He was receiving physical therapy in the home but insurance is no longer covering at home physical therapy. \par Reports bilateral shoulder pain.\par He has been compliant with hard collar.\par

## 2019-07-10 PROCEDURE — 72040 X-RAY EXAM NECK SPINE 2-3 VW: CPT

## 2019-07-10 PROCEDURE — C1889: CPT

## 2019-07-10 PROCEDURE — 81001 URINALYSIS AUTO W/SCOPE: CPT

## 2019-07-10 PROCEDURE — 95940 IONM IN OPERATNG ROOM 15 MIN: CPT

## 2019-07-10 PROCEDURE — 94660 CPAP INITIATION&MGMT: CPT

## 2019-07-10 PROCEDURE — 83930 ASSAY OF BLOOD OSMOLALITY: CPT

## 2019-07-10 PROCEDURE — 36415 COLL VENOUS BLD VENIPUNCTURE: CPT

## 2019-07-10 PROCEDURE — 86901 BLOOD TYPING SEROLOGIC RH(D): CPT

## 2019-07-10 PROCEDURE — 85027 COMPLETE CBC AUTOMATED: CPT

## 2019-07-10 PROCEDURE — 80048 BASIC METABOLIC PNL TOTAL CA: CPT

## 2019-07-10 PROCEDURE — 76000 FLUOROSCOPY <1 HR PHYS/QHP: CPT

## 2019-07-10 PROCEDURE — C1713: CPT

## 2019-07-10 PROCEDURE — 87040 BLOOD CULTURE FOR BACTERIA: CPT

## 2019-07-10 PROCEDURE — 86900 BLOOD TYPING SEROLOGIC ABO: CPT

## 2019-07-10 PROCEDURE — 97116 GAIT TRAINING THERAPY: CPT

## 2019-07-10 PROCEDURE — 97530 THERAPEUTIC ACTIVITIES: CPT

## 2019-07-10 PROCEDURE — 86850 RBC ANTIBODY SCREEN: CPT

## 2019-07-10 PROCEDURE — 71045 X-RAY EXAM CHEST 1 VIEW: CPT

## 2019-07-10 PROCEDURE — 82962 GLUCOSE BLOOD TEST: CPT

## 2019-07-10 PROCEDURE — 83735 ASSAY OF MAGNESIUM: CPT

## 2019-07-10 PROCEDURE — 84100 ASSAY OF PHOSPHORUS: CPT

## 2019-07-10 PROCEDURE — 97161 PT EVAL LOW COMPLEX 20 MIN: CPT

## 2019-09-06 ENCOUNTER — APPOINTMENT (OUTPATIENT)
Dept: NEUROSURGERY | Facility: CLINIC | Age: 54
End: 2019-09-06

## 2019-10-04 ENCOUNTER — OUTPATIENT (OUTPATIENT)
Dept: OUTPATIENT SERVICES | Facility: HOSPITAL | Age: 54
LOS: 1 days | End: 2019-10-04
Payer: COMMERCIAL

## 2019-10-04 ENCOUNTER — APPOINTMENT (OUTPATIENT)
Dept: NEUROSURGERY | Facility: CLINIC | Age: 54
End: 2019-10-04
Payer: MEDICAID

## 2019-10-04 VITALS
RESPIRATION RATE: 18 BRPM | WEIGHT: 175 LBS | DIASTOLIC BLOOD PRESSURE: 83 MMHG | BODY MASS INDEX: 25.92 KG/M2 | SYSTOLIC BLOOD PRESSURE: 125 MMHG | HEART RATE: 77 BPM | OXYGEN SATURATION: 97 % | HEIGHT: 69 IN

## 2019-10-04 DIAGNOSIS — R20.2 ANESTHESIA OF SKIN: ICD-10-CM

## 2019-10-04 DIAGNOSIS — Z98.1 ARTHRODESIS STATUS: ICD-10-CM

## 2019-10-04 DIAGNOSIS — Z09 ENCOUNTER FOR FOLLOW-UP EXAMINATION AFTER COMPLETED TREATMENT FOR CONDITIONS OTHER THAN MALIGNANT NEOPLASM: ICD-10-CM

## 2019-10-04 DIAGNOSIS — R20.0 ANESTHESIA OF SKIN: ICD-10-CM

## 2019-10-04 PROCEDURE — 99214 OFFICE O/P EST MOD 30 MIN: CPT

## 2019-10-04 PROCEDURE — 72040 X-RAY EXAM NECK SPINE 2-3 VW: CPT

## 2019-10-04 PROCEDURE — 72040 X-RAY EXAM NECK SPINE 2-3 VW: CPT | Mod: 26

## 2019-10-07 ENCOUNTER — APPOINTMENT (OUTPATIENT)
Dept: NEUROSURGERY | Facility: CLINIC | Age: 54
End: 2019-10-07
Payer: MEDICAID

## 2019-10-07 PROBLEM — R20.0 NUMBNESS AND TINGLING: Status: ACTIVE | Noted: 2019-01-29

## 2019-10-07 PROBLEM — Z98.1 S/P CERVICAL SPINAL FUSION: Status: ACTIVE | Noted: 2019-03-11

## 2019-10-07 PROBLEM — Z09 POSTOP CHECK: Status: ACTIVE | Noted: 2019-03-11

## 2019-10-08 NOTE — ADDENDUM
[FreeTextEntry1] : 2019 \par  \par OFFICE FOLLOWUP NOTE \par  \par  \par Re:	Rojelio Velasquez  \par :	61 \par MRN:  [id number] \par  \par Mr. Velasquez is a 54-year-old man who underwent cervical decompression and instrumented fusion with laminectomies at C6 and C7, and instrumentation from C5-T1. This was done on 2019 to treat his cervical spinal cord compression resulting in myelopathy as well as his cervical instability. Postoperatively he has improved. He is ambulating independently and is able to use his arms and legs functionally. He is no longer spastic and his neck pain has resolved. He still has some mild residual subjective weakness, especially in the right arm. \par  \par On examination he remains full strength. Sensation is intact. His posterior cervical incision has healed very well. \par  \par I reviewed his x-rays of the cervical spine which were performed today and it shows that the instrumentation is in good position. His neck alignment has remained stable. \par  \par Overall I am pleased with his progress. I discussed with Mr. Velasquez that the subjective weakness that he has may gradually improve. However, there is no exact time course for this. I reminded him that the purpose of surgery was to help in preventing progression of his neurological symptoms. I discussed with Mr. Velasquez that I think he should try as best as he can to get back to his usual activities, including work. I have given him full clearance to go back to work. I would like him to continue physical therapy 2-3 times per week, and I would like to see him again in 3 months with repeat x-rays. \par  \par Overall though, Mr. Velasquez has significantly improved neurologically and I expect that he will continue to. \par  \par I will plan to see him again in 3 months with new x-rays. \par  \par  \par  \par  \par  \par Shayan Rosas M.D. \par  of Neurosurgery \par [facility] \par Department of Neurosurgery \par [facility] \par [address] \par [facility], Third Floor \[place] [address] \par Tel: [phone] \par Email:  [email or web address] \par  \par  \par ARNAV/[facility] #[id number] \par  \par  \par

## 2019-10-08 NOTE — ASSESSMENT
[FreeTextEntry1] : Plan:\par - Cervical Spine X-ray done today reviewed in detail by Dr. Rosas with patient and family member.\par - Patient was extensively educated regarding his disease process.\par - 3 month follow-up with new Cervical Spine X-ray\par - Continue with Physical Therapy (prescription given)\par - OK to return to work, no restrictions (works at a car [employer])

## 2019-10-08 NOTE — HISTORY OF PRESENT ILLNESS
[de-identified] : 53 year old, right handed, man with no pertinent past medical history who presents to [facility] today for neurosurgical evaluation of cervical spine. He reported to [place] ED with reports of progressive worsening neck and back pain radiating to bilateral lower extremities with associated numbness/tingling.  He reports bilateral leg weakness and significant gait disturbance. He uses a cane to ambulate. Denies bowel/bladder incontinence. MRI cervical spine indicates severe cervical stenosis at C6-C7 with cervical cord compression.\par \par His symptoms are greatly affecting his quality of life. He can no longer work or perform daily routine activities due to severity of symptoms.

## 2019-10-08 NOTE — PHYSICAL EXAM
[General Appearance - Alert] : alert [Longitudinal] : longitudinal [Clean] : clean [Dry] : dry [Well-Healed] : well-healed [No Drainage] : without drainage [Normal Skin] : normal [Oriented To Time, Place, And Person] : oriented to person, place, and time [Sensation Tactile Decrease] : light touch was intact [Sensation Pain / Temperature Decrease] : pain and temperature was intact [No Visual Abnormalities] : no visible abnormalities [Sclera] : the sclera and conjunctiva were normal [Outer Ear] : the ears and nose were normal in appearance [Neck Appearance] : the appearance of the neck was normal [] : no respiratory distress [Abnormal Walk] : normal gait [Skin Color & Pigmentation] : normal skin color and pigmentation [FreeTextEntry1] : posterior neck

## 2019-10-08 NOTE — REASON FOR VISIT
[Follow-Up: _____] : a [unfilled] follow-up visit [Spouse] : spouse [FreeTextEntry1] : \par TODAY'S VISIT:  ([place] # [id number])\jocelin Returns for routine follow up and to review Cervical X-ray done this morning.\par He continues to complain of bilateral forearm tingling, now with associated pain and weakness. He is taking Tylenol for this. He also reports RLE weakness and mid-back pain, intermittently, as well as a "pinching" feeling at his posterior neck surgical site.\par He continues with physical therapy, states he has a few sessions left. \par \par PREVIOUS OFFICE VISIT 6/11/19:\par Returns to review CT cervical spine done earlier today.\par He reports compliance with cervical collar.\par He reports intermittent bilateral tingling in forearms.\par He is currently in physical therapy and states he is doing well.\par Reports improvement in bilateral extremity strength. He reports RIGHT lower extremity strength is slightly weaker than LEFT.\par \par PREVIOUS OFFICE VISIT 3/11/19:\par Reports he is doing well.\par Denies any signs of postop wound infection which could include but is not limited to redness/swelling/purulent drainage\par Denies CP/SOB/unilateral leg edema. Denies headaches, nausea, vomiting, dizziness, weakness. \par He is slowly introducing preop activities\par He was receiving physical therapy in the home but insurance is no longer covering at home physical therapy. \par Reports bilateral shoulder pain.\par He has been compliant with hard collar.\par

## 2019-10-08 NOTE — DATA REVIEWED
[de-identified] : EXAM:  CT CERVICAL SPINE                        PROCEDURE DATE:  06/03/2019      INTERPRETATION:  CLINICAL INFORMATION: Status post posterior cervical  fusion C5-T1. Follow-up.  Exam: CT imaging of the cervical spine is performed from the skull base  to the thoracic inlet. Axial, sagittal and coronal images are provided.  No contrast given.  Comparison: Cervical spine radiographs 03/11/2019, and a preoperative MRI  from 01/23/2019  Findings:  The patient is status post C6 and C7 laminectomy with C5-T1 posterior  fusion. There are noninterlocking vertical rods with bone graft material  along the rods and osseous fusion in progress to the posterior elements.  Lateral mass screws are placed at C5 and C6 and transpedicular screws are  present at C7 and T1. Hardware appears intact without fracture or early  loosening.  Alignment demonstrates a 3 mm anterolisthesis at C6-7, unchanged. No  facet subluxation or malalignment at the craniovertebral junction. 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  The cervical vertebral bodies are maintained in height and density. No  compression deformity or acute/displaced fracture.  There is no prevertebral swelling or other soft tissue abnormality. There  is expected scarring in the midline posterior neck without large fluid  collection.  At the individual levels:  C2-3: No bony spinal canal stenosis or large disc herniation. No neural  foraminal narrowing.  C3-4: There is central disc protrusion, similar to preoperative MRI with  mild spinal canal stenosis. No significant neural foraminal narrowing.  C4-5: No spinal canal stenosis or large disc herniation. There is right  asymmetric uncovertebral spurring with moderate neural foraminal  narrowing.  C5-6: The spinal canal is somewhat obscured by streak artifact. No bony  spinal canal compromise. Mild-moderate left neural foraminal narrowing  from uncovertebral spurring and facet hypertrophy.  C6-7: The spinal canal is well decompressed posteriorly with limited  visualization of intraspinal soft tissue due to streak artifact. No bony  spinal canal compromise. Listhesis and facet productive osteophyte  contribute to moderate bilateral neural foraminal narrowing, right more  than left.  C7-T1: No bony spinal canal, status post decompressive laminectomy. Soft  tissue assessment is again limited by streak artifact. There is mild  neural foraminal narrowing from uncovertebral spurring, right more and  left.  IMPRESSION:  Intact C5-T1 posterior fusion hardware. The spinal canal is well  decompressed at C6-7 and C7-T1 status post laminectomy. Residual  degenerative changes as detailed above.       "Thank you for the opportunity to participate in the care of this  patient."    RL GARCIA M.D. ATTENDING RADIOLOGIST This document has been electronically signed. Star  3 2019  2:52PM

## 2020-06-05 NOTE — DISCHARGE NOTE ADULT - CARE PROVIDERS DIRECT ADDRESSES
[email or web address]/Pathologist [email or web address] (Will Incorporate Name Of Surgeon From Intro If Not Blank): operated in two distinct and integrated capacities as the surgeon and pathologist.
